# Patient Record
Sex: MALE | Race: BLACK OR AFRICAN AMERICAN | NOT HISPANIC OR LATINO | URBAN - METROPOLITAN AREA
[De-identification: names, ages, dates, MRNs, and addresses within clinical notes are randomized per-mention and may not be internally consistent; named-entity substitution may affect disease eponyms.]

---

## 2019-03-31 ENCOUNTER — EMERGENCY (EMERGENCY)
Facility: HOSPITAL | Age: 59
LOS: 1 days | Discharge: HOME | End: 2019-03-31
Attending: EMERGENCY MEDICINE | Admitting: EMERGENCY MEDICINE
Payer: SELF-PAY

## 2019-03-31 VITALS
SYSTOLIC BLOOD PRESSURE: 137 MMHG | TEMPERATURE: 98 F | DIASTOLIC BLOOD PRESSURE: 82 MMHG | OXYGEN SATURATION: 99 % | RESPIRATION RATE: 16 BRPM | HEART RATE: 91 BPM

## 2019-03-31 VITALS
OXYGEN SATURATION: 100 % | DIASTOLIC BLOOD PRESSURE: 77 MMHG | HEART RATE: 67 BPM | SYSTOLIC BLOOD PRESSURE: 162 MMHG | RESPIRATION RATE: 18 BRPM | TEMPERATURE: 98 F

## 2019-03-31 DIAGNOSIS — Z98.89 OTHER SPECIFIED POSTPROCEDURAL STATES: Chronic | ICD-10-CM

## 2019-03-31 LAB
ALBUMIN SERPL ELPH-MCNC: 4.2 G/DL — SIGNIFICANT CHANGE UP (ref 3.3–5)
ALP SERPL-CCNC: 66 U/L — SIGNIFICANT CHANGE UP (ref 40–120)
ALT FLD-CCNC: 26 U/L — SIGNIFICANT CHANGE UP (ref 4–41)
ANION GAP SERPL CALC-SCNC: 14 MMO/L — SIGNIFICANT CHANGE UP (ref 7–14)
APPEARANCE UR: CLEAR — SIGNIFICANT CHANGE UP
APTT BLD: 30.1 SEC — SIGNIFICANT CHANGE UP (ref 27.5–36.3)
AST SERPL-CCNC: 33 U/L — SIGNIFICANT CHANGE UP (ref 4–40)
BACTERIA # UR AUTO: NEGATIVE — SIGNIFICANT CHANGE UP
BASE EXCESS BLDV CALC-SCNC: 6.1 MMOL/L — SIGNIFICANT CHANGE UP
BASOPHILS # BLD AUTO: 0.05 K/UL — SIGNIFICANT CHANGE UP (ref 0–0.2)
BASOPHILS NFR BLD AUTO: 0.5 % — SIGNIFICANT CHANGE UP (ref 0–2)
BILIRUB SERPL-MCNC: 0.4 MG/DL — SIGNIFICANT CHANGE UP (ref 0.2–1.2)
BILIRUB UR-MCNC: NEGATIVE — SIGNIFICANT CHANGE UP
BLD GP AB SCN SERPL QL: NEGATIVE — SIGNIFICANT CHANGE UP
BLOOD GAS VENOUS - CREATININE: 1.06 MG/DL — SIGNIFICANT CHANGE UP (ref 0.5–1.3)
BLOOD UR QL VISUAL: NEGATIVE — SIGNIFICANT CHANGE UP
BUN SERPL-MCNC: 16 MG/DL — SIGNIFICANT CHANGE UP (ref 7–23)
CALCIUM SERPL-MCNC: 9.2 MG/DL — SIGNIFICANT CHANGE UP (ref 8.4–10.5)
CHLORIDE BLDV-SCNC: 100 MMOL/L — SIGNIFICANT CHANGE UP (ref 96–108)
CHLORIDE SERPL-SCNC: 100 MMOL/L — SIGNIFICANT CHANGE UP (ref 98–107)
CO2 SERPL-SCNC: 24 MMOL/L — SIGNIFICANT CHANGE UP (ref 22–31)
COLOR SPEC: YELLOW — SIGNIFICANT CHANGE UP
CREAT SERPL-MCNC: 1.17 MG/DL — SIGNIFICANT CHANGE UP (ref 0.5–1.3)
EOSINOPHIL # BLD AUTO: 0.18 K/UL — SIGNIFICANT CHANGE UP (ref 0–0.5)
EOSINOPHIL NFR BLD AUTO: 1.7 % — SIGNIFICANT CHANGE UP (ref 0–6)
GAS PNL BLDV: 133 MMOL/L — LOW (ref 136–146)
GLUCOSE BLDV-MCNC: 114 — HIGH (ref 70–99)
GLUCOSE SERPL-MCNC: 117 MG/DL — HIGH (ref 70–99)
GLUCOSE UR-MCNC: NEGATIVE — SIGNIFICANT CHANGE UP
HCO3 BLDV-SCNC: 28 MMOL/L — HIGH (ref 20–27)
HCT VFR BLD CALC: 51 % — HIGH (ref 39–50)
HCT VFR BLDV CALC: 52.3 % — HIGH (ref 39–51)
HGB BLD-MCNC: 16.6 G/DL — SIGNIFICANT CHANGE UP (ref 13–17)
HGB BLDV-MCNC: 17.1 G/DL — HIGH (ref 13–17)
HYALINE CASTS # UR AUTO: HIGH
IMM GRANULOCYTES NFR BLD AUTO: 0.2 % — SIGNIFICANT CHANGE UP (ref 0–1.5)
INR BLD: 1.1 — SIGNIFICANT CHANGE UP (ref 0.88–1.17)
KETONES UR-MCNC: SIGNIFICANT CHANGE UP
LACTATE BLDV-MCNC: 2.2 MMOL/L — HIGH (ref 0.5–2)
LEUKOCYTE ESTERASE UR-ACNC: NEGATIVE — SIGNIFICANT CHANGE UP
LYMPHOCYTES # BLD AUTO: 2.99 K/UL — SIGNIFICANT CHANGE UP (ref 1–3.3)
LYMPHOCYTES # BLD AUTO: 28.3 % — SIGNIFICANT CHANGE UP (ref 13–44)
MCHC RBC-ENTMCNC: 29.6 PG — SIGNIFICANT CHANGE UP (ref 27–34)
MCHC RBC-ENTMCNC: 32.5 % — SIGNIFICANT CHANGE UP (ref 32–36)
MCV RBC AUTO: 91.1 FL — SIGNIFICANT CHANGE UP (ref 80–100)
MONOCYTES # BLD AUTO: 0.93 K/UL — HIGH (ref 0–0.9)
MONOCYTES NFR BLD AUTO: 8.8 % — SIGNIFICANT CHANGE UP (ref 2–14)
NEUTROPHILS # BLD AUTO: 6.4 K/UL — SIGNIFICANT CHANGE UP (ref 1.8–7.4)
NEUTROPHILS NFR BLD AUTO: 60.5 % — SIGNIFICANT CHANGE UP (ref 43–77)
NITRITE UR-MCNC: NEGATIVE — SIGNIFICANT CHANGE UP
NRBC # FLD: 0 K/UL — SIGNIFICANT CHANGE UP (ref 0–0)
PCO2 BLDV: 51 MMHG — SIGNIFICANT CHANGE UP (ref 41–51)
PH BLDV: 7.4 PH — SIGNIFICANT CHANGE UP (ref 7.32–7.43)
PH UR: 5.5 — SIGNIFICANT CHANGE UP (ref 5–8)
PLATELET # BLD AUTO: 162 K/UL — SIGNIFICANT CHANGE UP (ref 150–400)
PMV BLD: 10.4 FL — SIGNIFICANT CHANGE UP (ref 7–13)
PO2 BLDV: 42 MMHG — HIGH (ref 35–40)
POTASSIUM BLDV-SCNC: 6.4 MMOL/L — CRITICAL HIGH (ref 3.4–4.5)
POTASSIUM SERPL-MCNC: 4.1 MMOL/L — SIGNIFICANT CHANGE UP (ref 3.5–5.3)
POTASSIUM SERPL-SCNC: 4.1 MMOL/L — SIGNIFICANT CHANGE UP (ref 3.5–5.3)
PROT SERPL-MCNC: 6.9 G/DL — SIGNIFICANT CHANGE UP (ref 6–8.3)
PROT UR-MCNC: 30 — SIGNIFICANT CHANGE UP
PROTHROM AB SERPL-ACNC: 12.6 SEC — SIGNIFICANT CHANGE UP (ref 9.8–13.1)
RBC # BLD: 5.6 M/UL — SIGNIFICANT CHANGE UP (ref 4.2–5.8)
RBC # FLD: 12.5 % — SIGNIFICANT CHANGE UP (ref 10.3–14.5)
RBC CASTS # UR COMP ASSIST: SIGNIFICANT CHANGE UP (ref 0–?)
RH IG SCN BLD-IMP: POSITIVE — SIGNIFICANT CHANGE UP
SAO2 % BLDV: 78.9 % — SIGNIFICANT CHANGE UP (ref 60–85)
SODIUM SERPL-SCNC: 138 MMOL/L — SIGNIFICANT CHANGE UP (ref 135–145)
SP GR SPEC: 1.04 — SIGNIFICANT CHANGE UP (ref 1–1.04)
SQUAMOUS # UR AUTO: SIGNIFICANT CHANGE UP
UROBILINOGEN FLD QL: SIGNIFICANT CHANGE UP
WBC # BLD: 10.57 K/UL — HIGH (ref 3.8–10.5)
WBC # FLD AUTO: 10.57 K/UL — HIGH (ref 3.8–10.5)
WBC UR QL: SIGNIFICANT CHANGE UP (ref 0–?)

## 2019-03-31 PROCEDURE — 74177 CT ABD & PELVIS W/CONTRAST: CPT | Mod: 26

## 2019-03-31 PROCEDURE — 99284 EMERGENCY DEPT VISIT MOD MDM: CPT

## 2019-03-31 RX ORDER — SODIUM CHLORIDE 9 MG/ML
1000 INJECTION INTRAMUSCULAR; INTRAVENOUS; SUBCUTANEOUS ONCE
Qty: 0 | Refills: 0 | Status: COMPLETED | OUTPATIENT
Start: 2019-03-31 | End: 2019-03-31

## 2019-03-31 RX ORDER — METRONIDAZOLE 500 MG
1 TABLET ORAL
Qty: 30 | Refills: 0 | OUTPATIENT
Start: 2019-03-31 | End: 2019-04-09

## 2019-03-31 RX ORDER — CIPROFLOXACIN LACTATE 400MG/40ML
400 VIAL (ML) INTRAVENOUS ONCE
Qty: 0 | Refills: 0 | Status: COMPLETED | OUTPATIENT
Start: 2019-03-31 | End: 2019-03-31

## 2019-03-31 RX ORDER — CIPROFLOXACIN LACTATE 400MG/40ML
1 VIAL (ML) INTRAVENOUS
Qty: 20 | Refills: 0 | OUTPATIENT
Start: 2019-03-31 | End: 2019-04-09

## 2019-03-31 RX ORDER — ACETAMINOPHEN 500 MG
1000 TABLET ORAL ONCE
Qty: 0 | Refills: 0 | Status: COMPLETED | OUTPATIENT
Start: 2019-03-31 | End: 2019-03-31

## 2019-03-31 RX ORDER — METRONIDAZOLE 500 MG
500 TABLET ORAL ONCE
Qty: 0 | Refills: 0 | Status: DISCONTINUED | OUTPATIENT
Start: 2019-03-31 | End: 2019-03-31

## 2019-03-31 RX ORDER — ONDANSETRON 8 MG/1
4 TABLET, FILM COATED ORAL ONCE
Qty: 0 | Refills: 0 | Status: COMPLETED | OUTPATIENT
Start: 2019-03-31 | End: 2019-03-31

## 2019-03-31 RX ORDER — METRONIDAZOLE 500 MG
500 TABLET ORAL ONCE
Qty: 0 | Refills: 0 | Status: COMPLETED | OUTPATIENT
Start: 2019-03-31 | End: 2019-03-31

## 2019-03-31 RX ADMIN — Medication 500 MILLIGRAM(S): at 22:11

## 2019-03-31 RX ADMIN — Medication 200 MILLIGRAM(S): at 21:29

## 2019-03-31 RX ADMIN — SODIUM CHLORIDE 1000 MILLILITER(S): 9 INJECTION INTRAMUSCULAR; INTRAVENOUS; SUBCUTANEOUS at 20:53

## 2019-03-31 RX ADMIN — ONDANSETRON 4 MILLIGRAM(S): 8 TABLET, FILM COATED ORAL at 19:27

## 2019-03-31 RX ADMIN — Medication 400 MILLIGRAM(S): at 19:26

## 2019-03-31 RX ADMIN — SODIUM CHLORIDE 1000 MILLILITER(S): 9 INJECTION INTRAMUSCULAR; INTRAVENOUS; SUBCUTANEOUS at 19:27

## 2019-03-31 NOTE — ED PROVIDER NOTE - CLINICAL SUMMARY MEDICAL DECISION MAKING FREE TEXT BOX
Pt c h/o hernia repair p/w severe lower abd pain and guarding, small watery rectal dc today, constipated  Suspect SBO/LBO vs divertic.  Will send labs, pain control, CTAP, reassess.

## 2019-03-31 NOTE — ED ADULT TRIAGE NOTE - CHIEF COMPLAINT QUOTE
Pt c/o LLQ pain since yesterday morning with vomiting and diarrhea.  Pt also endorses generalized abdominal pain  and headache when using bathroom.  Pt reports saw blood in stool today as well.

## 2019-03-31 NOTE — ED PROVIDER NOTE - NSFOLLOWUPINSTRUCTIONS_ED_ALL_ED_FT
Your symptoms are likely caused by colitis.  PLease take Ciprofloxacin and Metronidazole as prescribed.  You may use Ibuprofen 600 mg every 6 hours or tylenol 650 mg every 4-6 hours as needed for pain.  Avoid fried foods, spicy foods, alcohol, or any other foods you might be sensitive to.  Be sure to drink plenty of fluids. You may use a stool softener such as Colace 100 mg three times a day with meals.     Return to the ER for fevers, worsening pain, vomiting, inability to have a BM, or any other concerning signs.     It is important that you follow up with your doctor and also with a Gastroenterologist.  You should have a colonoscopy done after you recover to ensure healing and screen for cancer.  Try to see your doctor this week.

## 2019-03-31 NOTE — ED PROVIDER NOTE - PROGRESS NOTE DETAILS
Feeling better, pain improved, tolerating PO.  Offered CDU to assess response to tx but pt prefers to go home.  Understands he may feel sicker but prefers to try PO abx at home and will return if worsening.  Wife at bedside in agreement. cipro/flagyl rx sent.

## 2019-04-02 LAB
BACTERIA UR CULT: SIGNIFICANT CHANGE UP
SPECIMEN SOURCE: SIGNIFICANT CHANGE UP

## 2019-05-17 ENCOUNTER — EMERGENCY (EMERGENCY)
Facility: HOSPITAL | Age: 59
LOS: 1 days | Discharge: HOME | End: 2019-05-17
Admitting: EMERGENCY MEDICINE
Payer: COMMERCIAL

## 2019-05-17 VITALS
RESPIRATION RATE: 15 BRPM | HEART RATE: 88 BPM | DIASTOLIC BLOOD PRESSURE: 62 MMHG | SYSTOLIC BLOOD PRESSURE: 101 MMHG | TEMPERATURE: 98 F | OXYGEN SATURATION: 98 %

## 2019-05-17 DIAGNOSIS — Z98.89 OTHER SPECIFIED POSTPROCEDURAL STATES: Chronic | ICD-10-CM

## 2019-05-17 PROCEDURE — 99053 MED SERV 10PM-8AM 24 HR FAC: CPT

## 2019-05-17 PROCEDURE — 99283 EMERGENCY DEPT VISIT LOW MDM: CPT | Mod: 25
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
## 2019-05-18 PROCEDURE — 73660 X-RAY EXAM OF TOE(S): CPT | Mod: 26,RT

## 2019-05-18 PROCEDURE — 72100 X-RAY EXAM L-S SPINE 2/3 VWS: CPT | Mod: 26

## 2019-05-18 RX ORDER — ACETAMINOPHEN 500 MG
650 TABLET ORAL ONCE
Refills: 0 | Status: COMPLETED | OUTPATIENT
Start: 2019-05-18 | End: 2019-05-18

## 2019-05-18 RX ORDER — LIDOCAINE 4 G/100G
1 CREAM TOPICAL ONCE
Refills: 0 | Status: COMPLETED | OUTPATIENT
Start: 2019-05-18 | End: 2019-05-18

## 2019-05-18 RX ORDER — IBUPROFEN 200 MG
600 TABLET ORAL ONCE
Refills: 0 | Status: COMPLETED | OUTPATIENT
Start: 2019-05-18 | End: 2019-05-18

## 2019-05-18 RX ORDER — CYCLOBENZAPRINE HYDROCHLORIDE 10 MG/1
5 TABLET, FILM COATED ORAL ONCE
Refills: 0 | Status: COMPLETED | OUTPATIENT
Start: 2019-05-18 | End: 2019-05-18

## 2019-05-18 RX ORDER — CYCLOBENZAPRINE HYDROCHLORIDE 10 MG/1
1 TABLET, FILM COATED ORAL
Qty: 12 | Refills: 0
Start: 2019-05-18 | End: 2019-05-21

## 2019-05-18 RX ORDER — IBUPROFEN 200 MG
1 TABLET ORAL
Qty: 16 | Refills: 0
Start: 2019-05-18 | End: 2019-05-21

## 2019-05-18 RX ADMIN — LIDOCAINE 1 PATCH: 4 CREAM TOPICAL at 00:35

## 2019-05-18 RX ADMIN — CYCLOBENZAPRINE HYDROCHLORIDE 5 MILLIGRAM(S): 10 TABLET, FILM COATED ORAL at 00:36

## 2019-05-18 RX ADMIN — Medication 600 MILLIGRAM(S): at 00:36

## 2019-05-18 RX ADMIN — Medication 650 MILLIGRAM(S): at 00:36
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
## 2019-05-18 NOTE — ED PROVIDER NOTE - PROGRESS NOTE DETAILS
FLYNN Tenorio: pt signed out to me by FLYNN Hargrove. Pt NAD, VSS, no acute complaints. Discussed results of xrays with the patient. PT ambulatory without assistance. Pt ok for dc.
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
## 2019-05-18 NOTE — ED PROVIDER NOTE - NSFOLLOWUPINSTRUCTIONS_ED_ALL_ED_FT
Limit further injury, over exertion, and rest affected area. Take Flexeril 10mg three times a day as needed for muscle spasms - caution drowsiness while taking this medication- do not drive or operate heavy machinery.  Take motrin 600mg every 6h as needed for pain. Please continue all home medications as directed. See your regular doctor within 72 hours for follow-up care. Return to ER for new or worsening symptoms.

## 2019-05-18 NOTE — ED PROVIDER NOTE - NONTENDER LOCATION
periumbilical/suprapubic/left costovertebral angle/left upper quadrant/right upper quadrant/right lower quadrant/right costovertebral angle/left lower quadrant/umbilical

## 2020-11-27 ENCOUNTER — EMERGENCY (EMERGENCY)
Facility: HOSPITAL | Age: 60
LOS: 1 days | Discharge: HOME | End: 2020-11-27
Attending: EMERGENCY MEDICINE | Admitting: EMERGENCY MEDICINE
Payer: MEDICAID

## 2020-11-27 VITALS
SYSTOLIC BLOOD PRESSURE: 138 MMHG | TEMPERATURE: 98 F | DIASTOLIC BLOOD PRESSURE: 77 MMHG | OXYGEN SATURATION: 100 % | HEART RATE: 81 BPM | HEIGHT: 66 IN | RESPIRATION RATE: 16 BRPM

## 2020-11-27 DIAGNOSIS — Z98.89 OTHER SPECIFIED POSTPROCEDURAL STATES: Chronic | ICD-10-CM

## 2020-11-27 PROCEDURE — 99284 EMERGENCY DEPT VISIT MOD MDM: CPT

## 2020-11-27 RX ORDER — SODIUM CHLORIDE 9 MG/ML
1000 INJECTION INTRAMUSCULAR; INTRAVENOUS; SUBCUTANEOUS ONCE
Refills: 0 | Status: COMPLETED | OUTPATIENT
Start: 2020-11-27 | End: 2020-11-27

## 2020-11-27 NOTE — ED PROVIDER NOTE - NSFOLLOWUPCLINICS_GEN_ALL_ED_FT
[facility]  Gastroenterology  [address] [place] [id number]  Phone: [phone]  Fax:   Follow Up Time: Routine

## 2020-11-27 NOTE — ED PROVIDER NOTE - PROGRESS NOTE DETAILS
GRIS: I was signed out this pt pending CT read which was read as uncomplicated acute diverticulitis. Pt aware of findings, concern for CA but explained there were not current CT findings concerning for this. Pt reports he has never had N/V during his last 3 days of LLQ pain. Does had history of constipation which may have led to his current condition. Pt had GI follow up and last Colonscopy was 1 year ago and told normal. Is tolerating PO, no reported fevers, chill. Explained that pt requires PMD and GI outpt follow up and no Abx will be given today but would advise to f/u with PMD and GI for further recs. If spikes fever or has chills or not able to tolerate PO, come right back to ED for further eval. Pt aware and amenable. he was given copy of all his labs and imaging results to f/u with PMD/GI with.

## 2020-11-27 NOTE — ED ADULT NURSE NOTE - OBJECTIVE STATEMENT
61 y/o male presents to ED complaining of left lower abdominal pain x3 days. Pt a&ox4, complaining of constipation, pt states he had a small bowel movement this am, was not like his usual bm's. Pt endorses passing gas and loss of appetite. pt denies sob, chest pain, n/v. 20g IV placed to LAC. Labs obtained as ordered. NS infusing.

## 2020-11-27 NOTE — ED PROVIDER NOTE - OBJECTIVE STATEMENT
59 y/o male with pmhx of HTN, constipation, hernia repair, presents to ED c/o LLQ pain x 2 days. States he has been straining to pass bowel movements, LBM today. Passing gas. No melena, no bloody stool. Has not taken any pain medication or stool softeners. No fever, chills, cp, sob, n/v/d, dysuria.

## 2020-11-27 NOTE — ED ADULT TRIAGE NOTE - CHIEF COMPLAINT QUOTE
Pt w/ hx of htn, left inguinal hernia  c/o lower abdominal pain, constipation Pt endorses 1 small BM in 3 days Pt endorses passing gas, Pt denies NV, black stools

## 2020-11-27 NOTE — ED PROVIDER NOTE - ATTENDING CONTRIBUTION TO CARE
Dolly: I have seen and examined the patient face to face, have reviewed and addended the HPI, PE and a/p as necessary.     61 yo M with HTN, constipation, hernia repair a/w LLQ pain x 2 days.  Noted to have decreased bowel movements with small bowel movement this AM.  Reports passing gas.  No fever/chills, No photophobia/eye pain/changes in vision, No ear pain/sore throat/dysphagia, No chest pain/palpitations, no SOB/cough/wheeze/stridor, No N/V/D, no dysuria/frequency/discharge, No neck/back pain, no rash, no changes in neurological status/function.     GEN - NAD; well appearing; A+O x3; non-toxic appearing; CARD -s1s2, RRR, no M,G,R; PULM - CTA b/l, symmetric breath sounds; ABD -  +BS, TTP in LLQ, soft, no guarding, no rebound, no masses; BACK - no CVA tenderness, Normal  spine; EXT - symmetric pulses, 2+ dp, capillary refill < 2 seconds, no cyanosis, no edema; NEURO - no focal neuro deficits, no slurred speech    61 yo M with HTN, constipation, hernia repair a/w LLQ pain x 2 days, concern for possible diverticulitis, vs intraabdominal abscess, pain meds refused.  No fevers, no chills, no other complaints.  Will continue to monitor and give IVF.  Pending CT scan.

## 2020-11-27 NOTE — ED PROVIDER NOTE - NSFOLLOWUPINSTRUCTIONS_ED_ALL_ED_FT
No signs of emergency medical condition on today's workup.  Presumptive diagnosis made, but further evaluation may be required by your primary care doctor or specialist for a definitive diagnosis.  Therefore, follow up as directed and if symptoms change/worsen or any emergency conditions, please return to the ER.     PLEASE FOLLOW UP WITH YOUR PRIMARY DOCTOR AND GASTROENTEROLOGIST (GI) DOCTOR WITH YOUR CURRENT MEDICAL CONDITION.     TAKE OVER THE COUNTER PAIN MEDS AS NEEDED FOR PAIN.     TAKE YOUR STOOL SOFTNERS IN THE MEANTIME AND DRINK LOTS OF FLUIDS.

## 2020-11-27 NOTE — ED PROVIDER NOTE - PATIENT PORTAL LINK FT
You can access the FollowMyHealth Patient Portal offered by [facility] by registering at the following website: [email or web address]. By joining [employer]s FollowMyHealth portal, you will also be able to view your health information using other applications (apps) compatible with our system.

## 2020-11-27 NOTE — ED PROVIDER NOTE - CLINICAL SUMMARY MEDICAL DECISION MAKING FREE TEXT BOX
61 y/o male with pmhx of HTN, constipation, hernia repair, presents to ED c/o LLQ pain x 2 days. States he has been straining to pass bowel movements, LBM today. Passing gas. No melena, no bloody stool. pt deferring pain medication. +LLQ tenderness on exam. plan to check CT r/o diverticulitis, labs, ua.

## 2020-11-28 LAB
ALBUMIN SERPL ELPH-MCNC: 3.6 G/DL — SIGNIFICANT CHANGE UP (ref 3.3–5)
ALP SERPL-CCNC: 52 U/L — SIGNIFICANT CHANGE UP (ref 40–120)
ALT FLD-CCNC: 19 U/L — SIGNIFICANT CHANGE UP (ref 4–41)
ANION GAP SERPL CALC-SCNC: 11 MMO/L — SIGNIFICANT CHANGE UP (ref 7–14)
APTT BLD: 31.1 SEC — SIGNIFICANT CHANGE UP (ref 27–36.3)
AST SERPL-CCNC: 22 U/L — SIGNIFICANT CHANGE UP (ref 4–40)
B PERT DNA SPEC QL NAA+PROBE: SIGNIFICANT CHANGE UP
BASE EXCESS BLDV CALC-SCNC: 4.9 MMOL/L — SIGNIFICANT CHANGE UP
BASOPHILS # BLD AUTO: 0.04 K/UL — SIGNIFICANT CHANGE UP (ref 0–0.2)
BASOPHILS NFR BLD AUTO: 0.6 % — SIGNIFICANT CHANGE UP (ref 0–2)
BILIRUB SERPL-MCNC: 0.2 MG/DL — SIGNIFICANT CHANGE UP (ref 0.2–1.2)
BLD GP AB SCN SERPL QL: NEGATIVE — SIGNIFICANT CHANGE UP
BLOOD GAS VENOUS - CREATININE: 1.25 MG/DL — SIGNIFICANT CHANGE UP (ref 0.5–1.3)
BLOOD GAS VENOUS - FIO2: 21 — SIGNIFICANT CHANGE UP
BUN SERPL-MCNC: 26 MG/DL — HIGH (ref 7–23)
C PNEUM DNA SPEC QL NAA+PROBE: SIGNIFICANT CHANGE UP
CALCIUM SERPL-MCNC: 8.9 MG/DL — SIGNIFICANT CHANGE UP (ref 8.4–10.5)
CHLORIDE BLDV-SCNC: 105 MMOL/L — SIGNIFICANT CHANGE UP (ref 96–108)
CHLORIDE SERPL-SCNC: 101 MMOL/L — SIGNIFICANT CHANGE UP (ref 98–107)
CO2 SERPL-SCNC: 27 MMOL/L — SIGNIFICANT CHANGE UP (ref 22–31)
CREAT SERPL-MCNC: 1.21 MG/DL — SIGNIFICANT CHANGE UP (ref 0.5–1.3)
EOSINOPHIL # BLD AUTO: 0.18 K/UL — SIGNIFICANT CHANGE UP (ref 0–0.5)
EOSINOPHIL NFR BLD AUTO: 2.5 % — SIGNIFICANT CHANGE UP (ref 0–6)
FLUAV H1 2009 PAND RNA SPEC QL NAA+PROBE: SIGNIFICANT CHANGE UP
FLUAV H1 RNA SPEC QL NAA+PROBE: SIGNIFICANT CHANGE UP
FLUAV H3 RNA SPEC QL NAA+PROBE: SIGNIFICANT CHANGE UP
FLUAV SUBTYP SPEC NAA+PROBE: SIGNIFICANT CHANGE UP
FLUBV RNA SPEC QL NAA+PROBE: SIGNIFICANT CHANGE UP
GAS PNL BLDV: 138 MMOL/L — SIGNIFICANT CHANGE UP (ref 136–146)
GLUCOSE BLDV-MCNC: 141 MG/DL — HIGH (ref 70–99)
GLUCOSE SERPL-MCNC: 152 MG/DL — HIGH (ref 70–99)
HADV DNA SPEC QL NAA+PROBE: SIGNIFICANT CHANGE UP
HCO3 BLDV-SCNC: 28 MMOL/L — HIGH (ref 20–27)
HCOV PNL SPEC NAA+PROBE: SIGNIFICANT CHANGE UP
HCT VFR BLD CALC: 45.3 % — SIGNIFICANT CHANGE UP (ref 39–50)
HCT VFR BLDV CALC: 46.7 % — SIGNIFICANT CHANGE UP (ref 39–51)
HGB BLD-MCNC: 14.7 G/DL — SIGNIFICANT CHANGE UP (ref 13–17)
HGB BLDV-MCNC: 15.2 G/DL — SIGNIFICANT CHANGE UP (ref 13–17)
HMPV RNA SPEC QL NAA+PROBE: SIGNIFICANT CHANGE UP
HPIV1 RNA SPEC QL NAA+PROBE: SIGNIFICANT CHANGE UP
HPIV2 RNA SPEC QL NAA+PROBE: SIGNIFICANT CHANGE UP
HPIV3 RNA SPEC QL NAA+PROBE: SIGNIFICANT CHANGE UP
HPIV4 RNA SPEC QL NAA+PROBE: SIGNIFICANT CHANGE UP
IMM GRANULOCYTES NFR BLD AUTO: 0.3 % — SIGNIFICANT CHANGE UP (ref 0–1.5)
INR BLD: 1.11 — SIGNIFICANT CHANGE UP (ref 0.88–1.16)
LACTATE BLDV-MCNC: 1.4 MMOL/L — SIGNIFICANT CHANGE UP (ref 0.5–2)
LYMPHOCYTES # BLD AUTO: 2.51 K/UL — SIGNIFICANT CHANGE UP (ref 1–3.3)
LYMPHOCYTES # BLD AUTO: 35.1 % — SIGNIFICANT CHANGE UP (ref 13–44)
MCHC RBC-ENTMCNC: 30 PG — SIGNIFICANT CHANGE UP (ref 27–34)
MCHC RBC-ENTMCNC: 32.5 % — SIGNIFICANT CHANGE UP (ref 32–36)
MCV RBC AUTO: 92.4 FL — SIGNIFICANT CHANGE UP (ref 80–100)
MONOCYTES # BLD AUTO: 0.57 K/UL — SIGNIFICANT CHANGE UP (ref 0–0.9)
MONOCYTES NFR BLD AUTO: 8 % — SIGNIFICANT CHANGE UP (ref 2–14)
NEUTROPHILS # BLD AUTO: 3.84 K/UL — SIGNIFICANT CHANGE UP (ref 1.8–7.4)
NEUTROPHILS NFR BLD AUTO: 53.5 % — SIGNIFICANT CHANGE UP (ref 43–77)
NRBC # FLD: 0 K/UL — SIGNIFICANT CHANGE UP (ref 0–0)
PCO2 BLDV: 51 MMHG — SIGNIFICANT CHANGE UP (ref 41–51)
PH BLDV: 7.39 PH — SIGNIFICANT CHANGE UP (ref 7.32–7.43)
PLATELET # BLD AUTO: 156 K/UL — SIGNIFICANT CHANGE UP (ref 150–400)
PMV BLD: 10.7 FL — SIGNIFICANT CHANGE UP (ref 7–13)
PO2 BLDV: 53 MMHG — HIGH (ref 35–40)
POTASSIUM BLDV-SCNC: 3.8 MMOL/L — SIGNIFICANT CHANGE UP (ref 3.4–4.5)
POTASSIUM SERPL-MCNC: 3.4 MMOL/L — LOW (ref 3.5–5.3)
POTASSIUM SERPL-SCNC: 3.4 MMOL/L — LOW (ref 3.5–5.3)
PROT SERPL-MCNC: 6.2 G/DL — SIGNIFICANT CHANGE UP (ref 6–8.3)
PROTHROM AB SERPL-ACNC: 12.7 SEC — SIGNIFICANT CHANGE UP (ref 10.6–13.6)
RAPID RVP RESULT: SIGNIFICANT CHANGE UP
RBC # BLD: 4.9 M/UL — SIGNIFICANT CHANGE UP (ref 4.2–5.8)
RBC # FLD: 12.2 % — SIGNIFICANT CHANGE UP (ref 10.3–14.5)
RH IG SCN BLD-IMP: POSITIVE — SIGNIFICANT CHANGE UP
RSV RNA SPEC QL NAA+PROBE: SIGNIFICANT CHANGE UP
RV+EV RNA SPEC QL NAA+PROBE: SIGNIFICANT CHANGE UP
SAO2 % BLDV: 88.3 % — HIGH (ref 60–85)
SARS-COV-2 RNA SPEC QL NAA+PROBE: SIGNIFICANT CHANGE UP
SODIUM SERPL-SCNC: 139 MMOL/L — SIGNIFICANT CHANGE UP (ref 135–145)
WBC # BLD: 7.16 K/UL — SIGNIFICANT CHANGE UP (ref 3.8–10.5)
WBC # FLD AUTO: 7.16 K/UL — SIGNIFICANT CHANGE UP (ref 3.8–10.5)

## 2020-11-28 PROCEDURE — 74177 CT ABD & PELVIS W/CONTRAST: CPT | Mod: 26

## 2020-11-28 RX ORDER — POLYETHYLENE GLYCOL 3350 17 G/17G
17 POWDER, FOR SOLUTION ORAL
Qty: 119 | Refills: 0
Start: 2020-11-28 | End: 2020-12-04

## 2020-11-28 RX ORDER — PIPERACILLIN AND TAZOBACTAM 4; .5 G/20ML; G/20ML
3.38 INJECTION, POWDER, LYOPHILIZED, FOR SOLUTION INTRAVENOUS ONCE
Refills: 0 | Status: COMPLETED | OUTPATIENT
Start: 2020-11-28 | End: 2020-11-28

## 2020-11-28 RX ORDER — ACETAMINOPHEN 500 MG
2 TABLET ORAL
Qty: 60 | Refills: 0
Start: 2020-11-28 | End: 2020-12-02

## 2020-11-28 RX ADMIN — SODIUM CHLORIDE 1000 MILLILITER(S): 9 INJECTION INTRAMUSCULAR; INTRAVENOUS; SUBCUTANEOUS at 03:20

## 2020-11-28 RX ADMIN — SODIUM CHLORIDE 1000 MILLILITER(S): 9 INJECTION INTRAMUSCULAR; INTRAVENOUS; SUBCUTANEOUS at 00:21

## 2020-11-28 RX ADMIN — PIPERACILLIN AND TAZOBACTAM 3.38 GRAM(S): 4; .5 INJECTION, POWDER, LYOPHILIZED, FOR SOLUTION INTRAVENOUS at 03:29

## 2020-11-28 RX ADMIN — PIPERACILLIN AND TAZOBACTAM 200 GRAM(S): 4; .5 INJECTION, POWDER, LYOPHILIZED, FOR SOLUTION INTRAVENOUS at 02:08

## 2021-04-23 NOTE — ED ADULT TRIAGE NOTE - HEIGHT IN INCHES
6 Doxepin Counseling:  Patient advised that the medication is sedating and not to drive a car after taking this medication. Patient informed of potential adverse effects including but not limited to dry mouth, urinary retention, and blurry vision.  The patient verbalized understanding of the proper use and possible adverse effects of doxepin.  All of the patient's questions and concerns were addressed.

## 2021-05-13 ENCOUNTER — EMERGENCY (EMERGENCY)
Facility: HOSPITAL | Age: 61
LOS: 1 days | Discharge: HOME | End: 2021-05-13
Attending: EMERGENCY MEDICINE | Admitting: EMERGENCY MEDICINE
Payer: MEDICAID

## 2021-05-13 VITALS
DIASTOLIC BLOOD PRESSURE: 70 MMHG | OXYGEN SATURATION: 98 % | RESPIRATION RATE: 16 BRPM | HEART RATE: 80 BPM | HEIGHT: 66 IN | SYSTOLIC BLOOD PRESSURE: 161 MMHG | TEMPERATURE: 98 F

## 2021-05-13 DIAGNOSIS — Z98.89 OTHER SPECIFIED POSTPROCEDURAL STATES: Chronic | ICD-10-CM

## 2021-05-13 LAB
ALBUMIN SERPL ELPH-MCNC: 4.2 G/DL — SIGNIFICANT CHANGE UP (ref 3.3–5)
ALP SERPL-CCNC: 69 U/L — SIGNIFICANT CHANGE UP (ref 40–120)
ALT FLD-CCNC: 13 U/L — SIGNIFICANT CHANGE UP (ref 4–41)
ANION GAP SERPL CALC-SCNC: 10 MMOL/L — SIGNIFICANT CHANGE UP (ref 7–14)
AST SERPL-CCNC: 29 U/L — SIGNIFICANT CHANGE UP (ref 4–40)
BASOPHILS # BLD AUTO: 0.03 K/UL — SIGNIFICANT CHANGE UP (ref 0–0.2)
BASOPHILS NFR BLD AUTO: 0.5 % — SIGNIFICANT CHANGE UP (ref 0–2)
BILIRUB SERPL-MCNC: 0.5 MG/DL — SIGNIFICANT CHANGE UP (ref 0.2–1.2)
BLOOD GAS VENOUS COMPREHENSIVE RESULT: SIGNIFICANT CHANGE UP
BUN SERPL-MCNC: 15 MG/DL — SIGNIFICANT CHANGE UP (ref 7–23)
CALCIUM SERPL-MCNC: 8.5 MG/DL — SIGNIFICANT CHANGE UP (ref 8.4–10.5)
CHLORIDE SERPL-SCNC: 104 MMOL/L — SIGNIFICANT CHANGE UP (ref 98–107)
CO2 SERPL-SCNC: 24 MMOL/L — SIGNIFICANT CHANGE UP (ref 22–31)
CREAT SERPL-MCNC: 1.06 MG/DL — SIGNIFICANT CHANGE UP (ref 0.5–1.3)
EOSINOPHIL # BLD AUTO: 0.15 K/UL — SIGNIFICANT CHANGE UP (ref 0–0.5)
EOSINOPHIL NFR BLD AUTO: 2.3 % — SIGNIFICANT CHANGE UP (ref 0–6)
GLUCOSE SERPL-MCNC: 99 MG/DL — SIGNIFICANT CHANGE UP (ref 70–99)
HCT VFR BLD CALC: 47.4 % — SIGNIFICANT CHANGE UP (ref 39–50)
HGB BLD-MCNC: 16.1 G/DL — SIGNIFICANT CHANGE UP (ref 13–17)
IANC: 3.02 K/UL — SIGNIFICANT CHANGE UP (ref 1.5–8.5)
IMM GRANULOCYTES NFR BLD AUTO: 0.2 % — SIGNIFICANT CHANGE UP (ref 0–1.5)
LIDOCAIN IGE QN: 39 U/L — SIGNIFICANT CHANGE UP (ref 7–60)
LYMPHOCYTES # BLD AUTO: 2.66 K/UL — SIGNIFICANT CHANGE UP (ref 1–3.3)
LYMPHOCYTES # BLD AUTO: 41.2 % — SIGNIFICANT CHANGE UP (ref 13–44)
MCHC RBC-ENTMCNC: 30 PG — SIGNIFICANT CHANGE UP (ref 27–34)
MCHC RBC-ENTMCNC: 34 GM/DL — SIGNIFICANT CHANGE UP (ref 32–36)
MCV RBC AUTO: 88.4 FL — SIGNIFICANT CHANGE UP (ref 80–100)
MONOCYTES # BLD AUTO: 0.58 K/UL — SIGNIFICANT CHANGE UP (ref 0–0.9)
MONOCYTES NFR BLD AUTO: 9 % — SIGNIFICANT CHANGE UP (ref 2–14)
NEUTROPHILS # BLD AUTO: 3.02 K/UL — SIGNIFICANT CHANGE UP (ref 1.8–7.4)
NEUTROPHILS NFR BLD AUTO: 46.8 % — SIGNIFICANT CHANGE UP (ref 43–77)
NRBC # BLD: 0 /100 WBCS — SIGNIFICANT CHANGE UP
NRBC # FLD: 0 K/UL — SIGNIFICANT CHANGE UP
PLATELET # BLD AUTO: 148 K/UL — LOW (ref 150–400)
POTASSIUM SERPL-MCNC: 4.2 MMOL/L — SIGNIFICANT CHANGE UP (ref 3.5–5.3)
POTASSIUM SERPL-SCNC: 4.2 MMOL/L — SIGNIFICANT CHANGE UP (ref 3.5–5.3)
PROT SERPL-MCNC: 6.8 G/DL — SIGNIFICANT CHANGE UP (ref 6–8.3)
RBC # BLD: 5.36 M/UL — SIGNIFICANT CHANGE UP (ref 4.2–5.8)
RBC # FLD: 12.9 % — SIGNIFICANT CHANGE UP (ref 10.3–14.5)
SODIUM SERPL-SCNC: 138 MMOL/L — SIGNIFICANT CHANGE UP (ref 135–145)
WBC # BLD: 6.45 K/UL — SIGNIFICANT CHANGE UP (ref 3.8–10.5)
WBC # FLD AUTO: 6.45 K/UL — SIGNIFICANT CHANGE UP (ref 3.8–10.5)

## 2021-05-13 PROCEDURE — 99285 EMERGENCY DEPT VISIT HI MDM: CPT

## 2021-05-13 RX ORDER — SODIUM CHLORIDE 9 MG/ML
1000 INJECTION INTRAMUSCULAR; INTRAVENOUS; SUBCUTANEOUS ONCE
Refills: 0 | Status: COMPLETED | OUTPATIENT
Start: 2021-05-13 | End: 2021-05-13

## 2021-05-13 RX ORDER — MORPHINE SULFATE 50 MG/1
4 CAPSULE, EXTENDED RELEASE ORAL ONCE
Refills: 0 | Status: DISCONTINUED | OUTPATIENT
Start: 2021-05-13 | End: 2021-05-13

## 2021-05-13 RX ADMIN — SODIUM CHLORIDE 1000 MILLILITER(S): 9 INJECTION INTRAMUSCULAR; INTRAVENOUS; SUBCUTANEOUS at 22:00

## 2021-05-13 RX ADMIN — MORPHINE SULFATE 4 MILLIGRAM(S): 50 CAPSULE, EXTENDED RELEASE ORAL at 22:00

## 2021-05-13 NOTE — ED ADULT TRIAGE NOTE - CHIEF COMPLAINT QUOTE
Pt arrives c/o abdominal pain x 2 days. States pain is constant. Reports nausea, vomiting, diarrhea, chills. Denies fevers, urinary complaints. PMHx HTN, hernia

## 2021-05-13 NOTE — ED PROVIDER NOTE - CLINICAL SUMMARY MEDICAL DECISION MAKING FREE TEXT BOX
Jeremiah White, PGY2: 61 year old male p/w abd pain, n/v/d x2 days. PMH diverticulitis but pain feels different. No f/c but is having diaphoresis. +Bloody stools for weeks, no NSAID use. Abd soft and diffusely tender, no rebound. Does not appear to be surgical abdomen. C/f divertic vs PUD vs gastroenteritis vs pancreatitis. Plan for labs, IVF, pain control, CT A/P, reassess.

## 2021-05-13 NOTE — ED ADULT NURSE NOTE - OBJECTIVE STATEMENT
Patient is a 61y male, A&Ox4, ambulatory, steady gait observed, complaining of generalized abdominal pain, was constant 1 day ago but now intermittent. Also reports nausea/vomiting yesterday but not today. history of a hernia and hypertension. Missed today and yesterday's dose of his blood pressure medication, patient is hypertensive, asymptomatic. Respirations even and unlabored. IV initiated by additional RN. Stretcher in lowest position, wheels locked, appropriate side rails in place, call bell in reach.

## 2021-05-13 NOTE — ED PROVIDER NOTE - ATTENDING CONTRIBUTION TO CARE
agree with resident note    "61 year old male PMH HTN, diverticulitis, hernia s/p repair, presents to ED for abdominal pain x2 days. Pain is diffuse, crampy. Associated with loose stools today, nausea, and diaphoresis. Patient also notes blood in stool for past few weeks. Does not use NSAIDs frequently. Patient denies f/c, cp, sob, constipation, or dysuria."    PE: well appearing; VSS; CTAB/L; s1 s2 no m/r/g abd soft/TTP LLQ, no rebound no guarding ext: no edema    Imp: abd pain; colitis/diverticulitis; labs, CT, pain control and reassess

## 2021-05-13 NOTE — ED PROVIDER NOTE - PROGRESS NOTE DETAILS
FLYNN Haney: Pt signed out to me pending CT, BP elevated ordered for home BP med FLYNN Haney: CT showing sigmoid colitis, ordered for cipro/flagyl, will d/c home with PMD and GI follow up. Pt states he has a referral to see a GI doctor already, GI list provided FLYNN Haney: CT showing sigmoid colitis, ordered for cipro/flagyl, will d/c home with PMD and GI follow up. Pt states he has a referral to see a GI doctor already, GI list provided. Pt reports he was recently prescribed a second antihypertensive by his PMD which he will start taking at home.

## 2021-05-13 NOTE — ED PROVIDER NOTE - NSFOLLOWUPINSTRUCTIONS_ED_ALL_ED_FT
Follow up with your primary care doctor within 1 week  Follow up with a GI doctor within 2 weeks, referral list provided  Take Ciprofloxacin 500mg (1 tablet) twice daily for 10 days  Take Flagyl 500mg (1 tablet) 3 times a day for 10 days  Return to the ER with any worsening or concerning symptoms, increased pain, vomiting, fever, weakness or any other concerns.

## 2021-05-13 NOTE — ED PROVIDER NOTE - PATIENT PORTAL LINK FT
You can access the FollowMyHealth Patient Portal offered by [facility] by registering at the following website: [email or web address]. By joining [employer]’s FollowMyHealth portal, you will also be able to view your health information using other applications (apps) compatible with our system.

## 2021-05-13 NOTE — ED PROVIDER NOTE - PHYSICAL EXAMINATION
GENERAL: A&Ox3, non-toxic appearing, no acute distress  HEENT: NCAT, EOMI, oral mucosa moist, normal conjunctiva  RESP: CTAB, no respiratory distress, no wheezes/rhonchi/rales, speaking in full sentences  CV: RRR, no murmurs/rubs/gallops  ABDOMEN: soft, diffusely tender, non-distended, no guarding, no rebound  MSK: no visible deformities  NEURO: no focal sensory or motor deficits, CN 2-12 grossly intact  SKIN: warm, normal color, well perfused, no rash  PSYCH: normal affect

## 2021-05-13 NOTE — ED PROVIDER NOTE - OBJECTIVE STATEMENT
61 year old male PMH HTN, diverticulitis, hernia s/p repair, presents to ED for abdominal pain x2 days. Pain is diffuse, crampy. Associated with loose stools today, nausea, and diaphoresis. Patient also notes blood in stool for past few weeks. Does not use NSAIDs frequently. Patient denies f/c, cp, sob, constipation, or dysuria.

## 2021-05-14 VITALS
HEART RATE: 64 BPM | TEMPERATURE: 98 F | SYSTOLIC BLOOD PRESSURE: 189 MMHG | DIASTOLIC BLOOD PRESSURE: 99 MMHG | OXYGEN SATURATION: 99 % | RESPIRATION RATE: 16 BRPM

## 2021-05-14 PROCEDURE — 74177 CT ABD & PELVIS W/CONTRAST: CPT | Mod: 26

## 2021-05-14 RX ORDER — METRONIDAZOLE 500 MG
500 TABLET ORAL ONCE
Refills: 0 | Status: COMPLETED | OUTPATIENT
Start: 2021-05-14 | End: 2021-05-14

## 2021-05-14 RX ORDER — MOXIFLOXACIN HYDROCHLORIDE TABLETS, 400 MG 400 MG/1
1 TABLET, FILM COATED ORAL
Qty: 20 | Refills: 0
Start: 2021-05-14 | End: 2021-05-23

## 2021-05-14 RX ORDER — CIPROFLOXACIN LACTATE 400MG/40ML
500 VIAL (ML) INTRAVENOUS ONCE
Refills: 0 | Status: COMPLETED | OUTPATIENT
Start: 2021-05-14 | End: 2021-05-14

## 2021-05-14 RX ORDER — METOPROLOL TARTRATE 50 MG
50 TABLET ORAL ONCE
Refills: 0 | Status: COMPLETED | OUTPATIENT
Start: 2021-05-14 | End: 2021-05-14

## 2021-05-14 RX ORDER — METRONIDAZOLE 500 MG
1 TABLET ORAL
Qty: 30 | Refills: 0
Start: 2021-05-14 | End: 2021-05-23

## 2021-05-14 RX ADMIN — Medication 500 MILLIGRAM(S): at 02:37

## 2021-05-14 RX ADMIN — Medication 50 MILLIGRAM(S): at 00:32

## 2022-05-31 NOTE — ED PROVIDER NOTE - CROS ED CARDIOVAS ALL NEG
negative...
I personally performed the service described in the documentation  recorded by the scribe in my presence, and it accurately and completely records my words and action.

## 2023-02-07 NOTE — ED PROVIDER NOTE - OBJECTIVE STATEMENT
58 yr old M, smoker, c h/o HTN, who p.w severe lower abd pain which started today and a/w chills today and yesterday.  Also with nausea and vomiting today.  Suffers from chronic constipation and hemorrhoids and is having BRBPR during BMs.  Hemorrhoids are generally reducible.  States he hasn't had a BM in several days but today had some watery rectal dc which he attributed to a BM.  Has been passing some flatus.  No urinary complaints.  Has not tried any laxatioves . Took tylenol today with minimal relief.  Has never had any of these sx before, except hemorrhoids and occasional bleeding. surgical hx significant for unilateral inguinal hernia repair, unknown if mesh.
No

## 2023-03-09 NOTE — ED ADULT TRIAGE NOTE - PAIN RATING/NUMBER SCALE (0-10): REST
Medication list updated in Outpatient Medication Record (OMR).   Spoke with patient who reports to only taking medications listed in OMR.   Patient reports to mostly taking oxycodone 5mg (1 tab every 4 to 6 hours prn) for pain.   Of Note:   - iStop history shows percocet 10/325 being dispensed frequently but patient notes to "not really taking" (?)   - Gabapentin 300mg BID filled 2/27/23 x 30 day supply; but patient notes "not really taking".   - Also shows dispense history for suboxone films but patient reports he does not take them.    Medication list updated in Outpatient Medication Record (OMR). iStop reference #[id number]  Spoke with patient who reports to only taking medications listed in OMR.   Patient reports to mostly taking oxycodone 5mg (1 tab every 4 to 6 hours prn) for pain.   Of Note:   - iStop history shows percocet 10/325 being dispensed frequently but patient notes to "not really taking" (?)   - Gabapentin 300mg BID filled 2/27/23 x 30 day supply; but patient notes "not really taking".   - Also shows dispense history for suboxone films but patient reports he does not take them.    9

## 2024-01-30 ENCOUNTER — EMERGENCY (EMERGENCY)
Facility: HOSPITAL | Age: 64
LOS: 1 days | Discharge: HOME | End: 2024-01-30
Attending: EMERGENCY MEDICINE | Admitting: EMERGENCY MEDICINE
Payer: MEDICAID

## 2024-01-30 ENCOUNTER — APPOINTMENT (OUTPATIENT)
Dept: UROLOGY | Facility: CLINIC | Age: 64
End: 2024-01-30
Payer: MEDICAID

## 2024-01-30 VITALS
SYSTOLIC BLOOD PRESSURE: 167 MMHG | HEART RATE: 79 BPM | TEMPERATURE: 98 F | RESPIRATION RATE: 15 BRPM | DIASTOLIC BLOOD PRESSURE: 102 MMHG | OXYGEN SATURATION: 100 %

## 2024-01-30 VITALS
OXYGEN SATURATION: 98 % | TEMPERATURE: 97.4 F | WEIGHT: 150 LBS | BODY MASS INDEX: 24.69 KG/M2 | DIASTOLIC BLOOD PRESSURE: 80 MMHG | HEART RATE: 72 BPM | SYSTOLIC BLOOD PRESSURE: 166 MMHG | HEIGHT: 65.5 IN

## 2024-01-30 DIAGNOSIS — Z98.89 OTHER SPECIFIED POSTPROCEDURAL STATES: Chronic | ICD-10-CM

## 2024-01-30 DIAGNOSIS — Z86.79 PERSONAL HISTORY OF OTHER DISEASES OF THE CIRCULATORY SYSTEM: ICD-10-CM

## 2024-01-30 DIAGNOSIS — F17.210 NICOTINE DEPENDENCE, CIGARETTES, UNCOMPLICATED: ICD-10-CM

## 2024-01-30 DIAGNOSIS — Z12.5 ENCOUNTER FOR SCREENING FOR MALIGNANT NEOPLASM OF PROSTATE: ICD-10-CM

## 2024-01-30 PROCEDURE — 99204 OFFICE O/P NEW MOD 45 MIN: CPT

## 2024-01-30 PROCEDURE — G2211 COMPLEX E/M VISIT ADD ON: CPT

## 2024-01-30 PROCEDURE — 99285 EMERGENCY DEPT VISIT HI MDM: CPT

## 2024-01-30 RX ORDER — TAMSULOSIN HYDROCHLORIDE 0.4 MG/1
0.4 CAPSULE ORAL
Qty: 30 | Refills: 1 | Status: ACTIVE | COMMUNITY
Start: 2024-01-30 | End: 1900-01-01

## 2024-01-30 RX ORDER — LISINOPRIL 20 MG/1
20 TABLET ORAL
Refills: 0 | Status: ACTIVE | COMMUNITY

## 2024-01-30 NOTE — ED ADULT TRIAGE NOTE - PATIENT ON (OXYGEN DELIVERY METHOD)
Pt admitted to unit in stable condition.   RN attempted to orientate pt to room and call light. Pt is not agreeable at this time but demonstrates ability to use call light.   Pt aggressive and demanding cares, while refusing to allow rn to start admission process.   Pt threatening to pull out NG tube and \"fired\" RN.  RN notified charge nurse and doctors of events. MD to respond bedside.   RN will continue to monitor pt from a far until new staffing can be aranged.    room air

## 2024-01-30 NOTE — PHYSICAL EXAM
[Normal Appearance] : normal appearance [Well Groomed] : well groomed [General Appearance - In No Acute Distress] : no acute distress [Edema] : no peripheral edema [Respiration, Rhythm And Depth] : normal respiratory rhythm and effort [Exaggerated Use Of Accessory Muscles For Inspiration] : no accessory muscle use [Abdomen Soft] : soft [Abdomen Tenderness] : non-tender [Costovertebral Angle Tenderness] : no ~M costovertebral angle tenderness [Normal Station and Gait] : the gait and station were normal for the patient's age [] : no rash [No Focal Deficits] : no focal deficits [Oriented To Time, Place, And Person] : oriented to person, place, and time [Affect] : the affect was normal [Mood] : the mood was normal [No Palpable Adenopathy] : no palpable adenopathy [de-identified] : MARVIN deferred due to bleeding per rectum after colonoscopy and hemorrhoid procedure

## 2024-01-30 NOTE — ED ADULT TRIAGE NOTE - CHIEF COMPLAINT QUOTE
presents C/O rectal bleeding S/P colonoscopy 1/23. denies AC use. No complaints of chest pain, headache, nausea, dizziness, vomiting  SOB, fever, chills verbalized. Phx HTN

## 2024-01-30 NOTE — ASSESSMENT
[FreeTextEntry1] : Very pleasant 63-year-old gentleman who presents for evaluation of BPH, screening for prostate cancer -Discussed the natural history of BPH, as well as typical symptoms of an enlarged prostate. Discussed potential complications that could arise from BPH, including but not limited to urinary tract infections, bladder stones, and renal impairment. -Trial of Flomax -I discussed the risks, benefits, alternatives, and possible side effects of alpha blocker therapy with the patient, including but not limited to dizziness, lightheadedness, headache, blurred vision, retrograde ejaculation, and priapism with the patient. I also discussed that the patient must inform his ophthalmologist that he has taken an alpha blocker prior to undergoing cataract or glaucoma surgery. -PSA -BMP -A1c -Follow-up in 1 month  Patient is being seen today for evaluation and management of a chronic and longitudinal ongoing condition and I am of the primary treating physician

## 2024-01-30 NOTE — HISTORY OF PRESENT ILLNESS
[FreeTextEntry1] : Very pleasant 63-year-old gentleman who presents for evaluation of BPH, screening for prostate cancer.  He was recently told that he had an enlarged prostate.  Review of records demonstrates that he had a CT scan in 2021 which demonstrated an enlarged prostate.  He reports dribbling of urination at times.  He reports a weak urinary stream at times.  Nocturia x 1.  No dysuria.  No flank pain or suprapubic pain.  He reports that he recently had a colonoscopy and treatment of hemorrhoids.  He still reports that he has bleeding per rectum.  No family history of prostate cancer.

## 2024-01-31 VITALS
SYSTOLIC BLOOD PRESSURE: 157 MMHG | DIASTOLIC BLOOD PRESSURE: 76 MMHG | OXYGEN SATURATION: 100 % | HEART RATE: 63 BPM | TEMPERATURE: 98 F | RESPIRATION RATE: 14 BRPM

## 2024-01-31 LAB
ALBUMIN SERPL ELPH-MCNC: 3.9 G/DL — SIGNIFICANT CHANGE UP (ref 3.3–5)
ALP SERPL-CCNC: 73 U/L — SIGNIFICANT CHANGE UP (ref 40–120)
ALT FLD-CCNC: 16 U/L — SIGNIFICANT CHANGE UP (ref 4–41)
ANION GAP SERPL CALC-SCNC: 10 MMOL/L — SIGNIFICANT CHANGE UP (ref 7–14)
ANION GAP SERPL CALC-SCNC: 13 MMOL/L
APTT BLD: 31.2 SEC — SIGNIFICANT CHANGE UP (ref 24.5–35.6)
AST SERPL-CCNC: 24 U/L — SIGNIFICANT CHANGE UP (ref 4–40)
BASOPHILS # BLD AUTO: 0.04 K/UL — SIGNIFICANT CHANGE UP (ref 0–0.2)
BASOPHILS NFR BLD AUTO: 0.4 % — SIGNIFICANT CHANGE UP (ref 0–2)
BILIRUB SERPL-MCNC: 0.4 MG/DL — SIGNIFICANT CHANGE UP (ref 0.2–1.2)
BLD GP AB SCN SERPL QL: NEGATIVE — SIGNIFICANT CHANGE UP
BUN SERPL-MCNC: 15 MG/DL
BUN SERPL-MCNC: 15 MG/DL — SIGNIFICANT CHANGE UP (ref 7–23)
CALCIUM SERPL-MCNC: 8.7 MG/DL
CALCIUM SERPL-MCNC: 8.9 MG/DL — SIGNIFICANT CHANGE UP (ref 8.4–10.5)
CHLORIDE SERPL-SCNC: 102 MMOL/L
CHLORIDE SERPL-SCNC: 102 MMOL/L — SIGNIFICANT CHANGE UP (ref 98–107)
CO2 SERPL-SCNC: 26 MMOL/L
CO2 SERPL-SCNC: 28 MMOL/L — SIGNIFICANT CHANGE UP (ref 22–31)
CREAT SERPL-MCNC: 1.1 MG/DL
CREAT SERPL-MCNC: 1.12 MG/DL — SIGNIFICANT CHANGE UP (ref 0.5–1.3)
EGFR: 75 ML/MIN/1.73M2 — SIGNIFICANT CHANGE UP
EGFR: 76 ML/MIN/1.73M2
EOSINOPHIL # BLD AUTO: 0.15 K/UL — SIGNIFICANT CHANGE UP (ref 0–0.5)
EOSINOPHIL NFR BLD AUTO: 1.6 % — SIGNIFICANT CHANGE UP (ref 0–6)
ESTIMATED AVERAGE GLUCOSE: 108 MG/DL
GLUCOSE SERPL-MCNC: 115 MG/DL — HIGH (ref 70–99)
GLUCOSE SERPL-MCNC: 82 MG/DL
HBA1C MFR BLD HPLC: 5.4 %
HCT VFR BLD CALC: 42.5 % — SIGNIFICANT CHANGE UP (ref 39–50)
HGB BLD-MCNC: 14.9 G/DL — SIGNIFICANT CHANGE UP (ref 13–17)
IANC: 5.7 K/UL — SIGNIFICANT CHANGE UP (ref 1.8–7.4)
IMM GRANULOCYTES NFR BLD AUTO: 0.3 % — SIGNIFICANT CHANGE UP (ref 0–0.9)
INR BLD: 1.02 RATIO — SIGNIFICANT CHANGE UP (ref 0.85–1.18)
LYMPHOCYTES # BLD AUTO: 2.69 K/UL — SIGNIFICANT CHANGE UP (ref 1–3.3)
LYMPHOCYTES # BLD AUTO: 29 % — SIGNIFICANT CHANGE UP (ref 13–44)
MCHC RBC-ENTMCNC: 30.5 PG — SIGNIFICANT CHANGE UP (ref 27–34)
MCHC RBC-ENTMCNC: 35.1 GM/DL — SIGNIFICANT CHANGE UP (ref 32–36)
MCV RBC AUTO: 86.9 FL — SIGNIFICANT CHANGE UP (ref 80–100)
MONOCYTES # BLD AUTO: 0.67 K/UL — SIGNIFICANT CHANGE UP (ref 0–0.9)
MONOCYTES NFR BLD AUTO: 7.2 % — SIGNIFICANT CHANGE UP (ref 2–14)
NEUTROPHILS # BLD AUTO: 5.7 K/UL — SIGNIFICANT CHANGE UP (ref 1.8–7.4)
NEUTROPHILS NFR BLD AUTO: 61.5 % — SIGNIFICANT CHANGE UP (ref 43–77)
NRBC # BLD: 0 /100 WBCS — SIGNIFICANT CHANGE UP (ref 0–0)
NRBC # FLD: 0 K/UL — SIGNIFICANT CHANGE UP (ref 0–0)
PLATELET # BLD AUTO: 190 K/UL — SIGNIFICANT CHANGE UP (ref 150–400)
POTASSIUM SERPL-MCNC: 3.7 MMOL/L — SIGNIFICANT CHANGE UP (ref 3.5–5.3)
POTASSIUM SERPL-SCNC: 3.7 MMOL/L — SIGNIFICANT CHANGE UP (ref 3.5–5.3)
POTASSIUM SERPL-SCNC: 4.5 MMOL/L
PROT SERPL-MCNC: 6.5 G/DL — SIGNIFICANT CHANGE UP (ref 6–8.3)
PROTHROM AB SERPL-ACNC: 11.4 SEC — SIGNIFICANT CHANGE UP (ref 9.5–13)
PSA SERPL-MCNC: 6.81 NG/ML
RBC # BLD: 4.89 M/UL — SIGNIFICANT CHANGE UP (ref 4.2–5.8)
RBC # FLD: 12.8 % — SIGNIFICANT CHANGE UP (ref 10.3–14.5)
RH IG SCN BLD-IMP: POSITIVE — SIGNIFICANT CHANGE UP
SODIUM SERPL-SCNC: 140 MMOL/L — SIGNIFICANT CHANGE UP (ref 135–145)
SODIUM SERPL-SCNC: 141 MMOL/L
WBC # BLD: 9.28 K/UL — SIGNIFICANT CHANGE UP (ref 3.8–10.5)
WBC # FLD AUTO: 9.28 K/UL — SIGNIFICANT CHANGE UP (ref 3.8–10.5)

## 2024-01-31 PROCEDURE — 74178 CT ABD&PLV WO CNTR FLWD CNTR: CPT | Mod: 26,MA

## 2024-01-31 RX ORDER — SODIUM CHLORIDE 9 MG/ML
1000 INJECTION INTRAMUSCULAR; INTRAVENOUS; SUBCUTANEOUS ONCE
Refills: 0 | Status: COMPLETED | OUTPATIENT
Start: 2024-01-31 | End: 2024-01-31

## 2024-01-31 RX ADMIN — SODIUM CHLORIDE 1000 MILLILITER(S): 9 INJECTION INTRAMUSCULAR; INTRAVENOUS; SUBCUTANEOUS at 01:35

## 2024-01-31 NOTE — ED PROVIDER NOTE - PROGRESS NOTE DETAILS
LANDRY: Pt H&H is WNL, no need for transfusion. Labs otherwise unremarkable. Still pending CTA read by rads. Likely discharge if normal to f/u with PMD/GI MD. NOMAN Jaimes MD - PGY-5: rectal exam chaperoned by Dr Gonzalez shows an external hemorrhoid.  No gross bleeding. GRIS: Patient's labs reviewed and are unremarkable.  Patient's blood type is AB+ which patient was made aware.  CT abdomen pelvis with CT contrast shows no angiographic evidence of active GI bleeding or hemorrhage.  Patient was made aware of findings and recommended follow-up with his GI doctor outpatient and his PMD.  Return precautions explained and understood.

## 2024-01-31 NOTE — ED PROVIDER NOTE - OBJECTIVE STATEMENT
63-year-old male with history of diverticulosis but no other medical issues including not on anticoagulation that presents with GI bleed.  Patient states that he had a colonoscopy on January 23 with Dr. ruiz and since he has had GI bleeding since.  Patient states initially was bright red but in the last 2 days has subsided and slowing down and now only with wiping.  Has some lightheadedness and headache which is unusual for him but no loss of consciousness or syncope and no chest pain or shortness of breath or palpitations.  Denies any weakness.  Has not been eating normally but not because he is nauseous so weak.  Denies any fevers, chills.  He states that his colonoscopy this was his third 1 and had polyps and hemorrhoids removed and banded which he had in the past as well.  Never cancerous.  Non-smoker no drinking no drugs.  He went to his urologist today who told him to come to the ED for evaluation for GI bleed.  Patient denies any abdominal pain, nausea, vomiting, diarrhea.

## 2024-01-31 NOTE — ED PROVIDER NOTE - ATTENDING CONTRIBUTION TO CARE
I, Dr Earl Gonzalez wrote the initial note in its entirety and the resident only contributed to the progress note and disposition with which I agree.

## 2024-01-31 NOTE — ED PROVIDER NOTE - NSFOLLOWUPINSTRUCTIONS_ED_ALL_ED_FT
You were seen in the ED for GI bleeding after colonoscopy.      We obtained labs and imaging.    Your labs did not show any significant anemia where he needed or required blood transfusion.    Your CTA did not show any active bleeding at this time.    It was determined that you have no life threatening injuries or condition that requires you to be admitted to the hospital or have any additional testing while in the ED.     Attached are your results from todays visit.     Please take these results to follow up with your primary care doctor so that they can determine if you need any additional testing or treatment as an outpatient.     If your symptoms worsen or change, you can return to the ED for further evaluation and care at that time.
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
## 2024-01-31 NOTE — ED PROVIDER NOTE - PATIENT PORTAL LINK FT
You can access the FollowMyHealth Patient Portal offered by [facility] by registering at the following website: [email or web address]. By joining [employer]’s FollowMyHealth portal, you will also be able to view your health information using other applications (apps) compatible with our system.

## 2024-01-31 NOTE — ED ADULT NURSE REASSESSMENT NOTE - NS ED NURSE REASSESS COMMENT FT1
BREAK RN: pt a&ox4 with no new complaints. pt pending CT read and dispo. pt resting comfortably in stretcher at this time. safety maintained

## 2024-01-31 NOTE — ED PROVIDER NOTE - CLINICAL SUMMARY MEDICAL DECISION MAKING FREE TEXT BOX
63-year-old male with history of diverticulosis but no other medical issues and not on anticoagulation that recently had a colonoscopy with polyps removed and hemorrhoid banding that comes in with GI bleed for 6 days.  Patient states that the bleeding is slowing down.  Abdomen soft nontender.  At this time will obtain labs to check for anemia and obtain CTA to see for active bleed although unlikely.  Will reassess after workup in the ED.  Will also provide IV fluids in the meantime for hydration.

## 2024-01-31 NOTE — ED PROVIDER NOTE - NSFOLLOWUPCLINICS_GEN_ALL_ED_FT
[facility] Specialty Clinics  General Surgery  [address] [place] [address] - 3rd [place] [address]  Phone: [phone]  Fax:   Follow Up Time: Routine

## 2024-02-16 ENCOUNTER — APPOINTMENT (OUTPATIENT)
Dept: UROLOGY | Facility: CLINIC | Age: 64
End: 2024-02-16
Payer: MEDICAID

## 2024-02-16 VITALS — SYSTOLIC BLOOD PRESSURE: 192 MMHG | HEART RATE: 75 BPM | DIASTOLIC BLOOD PRESSURE: 91 MMHG

## 2024-02-16 VITALS
OXYGEN SATURATION: 97 % | BODY MASS INDEX: 25.16 KG/M2 | SYSTOLIC BLOOD PRESSURE: 178 MMHG | WEIGHT: 151 LBS | TEMPERATURE: 97.9 F | HEIGHT: 65 IN | DIASTOLIC BLOOD PRESSURE: 86 MMHG | HEART RATE: 77 BPM

## 2024-02-16 VITALS — SYSTOLIC BLOOD PRESSURE: 185 MMHG | DIASTOLIC BLOOD PRESSURE: 98 MMHG

## 2024-02-16 VITALS — DIASTOLIC BLOOD PRESSURE: 91 MMHG | SYSTOLIC BLOOD PRESSURE: 208 MMHG

## 2024-02-16 VITALS — DIASTOLIC BLOOD PRESSURE: 91 MMHG | SYSTOLIC BLOOD PRESSURE: 182 MMHG

## 2024-02-16 PROCEDURE — 76872 US TRANSRECTAL: CPT

## 2024-02-16 PROCEDURE — 76942 ECHO GUIDE FOR BIOPSY: CPT | Mod: 59

## 2024-02-16 PROCEDURE — 55700: CPT | Mod: 22

## 2024-02-23 LAB — PROSTATE BIOPSY: NORMAL

## 2024-02-26 ENCOUNTER — APPOINTMENT (OUTPATIENT)
Dept: SURGERY | Facility: HOSPITAL | Age: 64
End: 2024-02-26

## 2024-02-28 ENCOUNTER — APPOINTMENT (OUTPATIENT)
Dept: UROLOGY | Facility: CLINIC | Age: 64
End: 2024-02-28
Payer: MEDICAID

## 2024-02-28 VITALS
DIASTOLIC BLOOD PRESSURE: 96 MMHG | OXYGEN SATURATION: 98 % | TEMPERATURE: 96.8 F | HEART RATE: 80 BPM | SYSTOLIC BLOOD PRESSURE: 165 MMHG | HEIGHT: 65 IN | WEIGHT: 151 LBS | BODY MASS INDEX: 25.16 KG/M2

## 2024-02-28 DIAGNOSIS — R97.20 ELEVATED PROSTATE, SPECIFIC ANTIGEN [PSA]: ICD-10-CM

## 2024-02-28 DIAGNOSIS — N40.1 BENIGN PROSTATIC HYPERPLASIA WITH LOWER URINARY TRACT SYMPMS: ICD-10-CM

## 2024-02-28 DIAGNOSIS — N13.8 BENIGN PROSTATIC HYPERPLASIA WITH LOWER URINARY TRACT SYMPMS: ICD-10-CM

## 2024-02-28 PROCEDURE — G2211 COMPLEX E/M VISIT ADD ON: CPT | Mod: NC,1L

## 2024-02-28 PROCEDURE — 99213 OFFICE O/P EST LOW 20 MIN: CPT

## 2024-02-28 NOTE — PHYSICAL EXAM
[Normal Appearance] : normal appearance [Well Groomed] : well groomed [Edema] : no peripheral edema [General Appearance - In No Acute Distress] : no acute distress [Respiration, Rhythm And Depth] : normal respiratory rhythm and effort [Exaggerated Use Of Accessory Muscles For Inspiration] : no accessory muscle use [Abdomen Soft] : soft [Abdomen Tenderness] : non-tender [Urinary Bladder Findings] : the bladder was normal on palpation [Costovertebral Angle Tenderness] : no ~M costovertebral angle tenderness [Normal Station and Gait] : the gait and station were normal for the patient's age [] : no rash [Oriented To Time, Place, And Person] : oriented to person, place, and time [No Focal Deficits] : no focal deficits [Affect] : the affect was normal [Mood] : the mood was normal [No Palpable Adenopathy] : no palpable adenopathy
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
## 2024-02-28 NOTE — HISTORY OF PRESENT ILLNESS
[FreeTextEntry1] : Very pleasant 63-year-old gentleman who presents for follow-up of elevated PSA, BPH.  He underwent a prostate biopsy last week, however unfortunately this examination had to be terminated early due to significant hypertension.  He feels better now.  Results of biopsy demonstrated benign prostate tissue in all 5 cores.  He reports no problems after the biopsy.

## 2024-03-01 ENCOUNTER — APPOINTMENT (OUTPATIENT)
Dept: UROLOGY | Facility: CLINIC | Age: 64
End: 2024-03-01

## 2024-05-08 ENCOUNTER — APPOINTMENT (OUTPATIENT)
Dept: UROLOGY | Facility: CLINIC | Age: 64
End: 2024-05-08

## 2024-07-12 ENCOUNTER — INPATIENT (INPATIENT)
Facility: HOSPITAL | Age: 64
LOS: 2 days | Discharge: HOME | End: 2024-07-15
Attending: HOSPITALIST | Admitting: HOSPITALIST
Payer: MEDICAID

## 2024-07-12 VITALS
HEART RATE: 101 BPM | RESPIRATION RATE: 16 BRPM | OXYGEN SATURATION: 100 % | TEMPERATURE: 98 F | SYSTOLIC BLOOD PRESSURE: 98 MMHG | WEIGHT: 143.08 LBS | DIASTOLIC BLOOD PRESSURE: 69 MMHG

## 2024-07-12 DIAGNOSIS — N40.0 BENIGN PROSTATIC HYPERPLASIA WITHOUT LOWER URINARY TRACT SYMPTOMS: ICD-10-CM

## 2024-07-12 DIAGNOSIS — I10 ESSENTIAL (PRIMARY) HYPERTENSION: ICD-10-CM

## 2024-07-12 DIAGNOSIS — A41.9 SEPSIS, UNSPECIFIED ORGANISM: ICD-10-CM

## 2024-07-12 DIAGNOSIS — D72.829 ELEVATED WHITE BLOOD CELL COUNT, UNSPECIFIED: ICD-10-CM

## 2024-07-12 DIAGNOSIS — R82.90 UNSPECIFIED ABNORMAL FINDINGS IN URINE: ICD-10-CM

## 2024-07-12 DIAGNOSIS — E87.5 HYPERKALEMIA: ICD-10-CM

## 2024-07-12 DIAGNOSIS — E87.1 HYPO-OSMOLALITY AND HYPONATREMIA: ICD-10-CM

## 2024-07-12 DIAGNOSIS — R79.89 OTHER SPECIFIED ABNORMAL FINDINGS OF BLOOD CHEMISTRY: ICD-10-CM

## 2024-07-12 DIAGNOSIS — N17.9 ACUTE KIDNEY FAILURE, UNSPECIFIED: ICD-10-CM

## 2024-07-12 DIAGNOSIS — Z29.9 ENCOUNTER FOR PROPHYLACTIC MEASURES, UNSPECIFIED: ICD-10-CM

## 2024-07-12 DIAGNOSIS — R19.7 DIARRHEA, UNSPECIFIED: ICD-10-CM

## 2024-07-12 DIAGNOSIS — N39.0 URINARY TRACT INFECTION, SITE NOT SPECIFIED: ICD-10-CM

## 2024-07-12 DIAGNOSIS — Z98.89 OTHER SPECIFIED POSTPROCEDURAL STATES: Chronic | ICD-10-CM

## 2024-07-12 LAB
ALBUMIN SERPL ELPH-MCNC: 3.2 G/DL — LOW (ref 3.3–5)
ALBUMIN SERPL ELPH-MCNC: 3.6 G/DL — SIGNIFICANT CHANGE UP (ref 3.3–5)
ALP SERPL-CCNC: 124 U/L — HIGH (ref 40–120)
ALP SERPL-CCNC: 137 U/L — HIGH (ref 40–120)
ALT FLD-CCNC: 38 U/L — SIGNIFICANT CHANGE UP (ref 4–41)
ALT FLD-CCNC: 48 U/L — HIGH (ref 4–41)
ANION GAP SERPL CALC-SCNC: 17 MMOL/L — HIGH (ref 7–14)
ANION GAP SERPL CALC-SCNC: 18 MMOL/L — HIGH (ref 7–14)
APPEARANCE UR: ABNORMAL
APTT BLD: 33.3 SEC — SIGNIFICANT CHANGE UP (ref 24.5–35.6)
AST SERPL-CCNC: 58 U/L — HIGH (ref 4–40)
AST SERPL-CCNC: 81 U/L — HIGH (ref 4–40)
BACTERIA # UR AUTO: ABNORMAL /HPF
BASOPHILS # BLD AUTO: 0 K/UL — SIGNIFICANT CHANGE UP (ref 0–0.2)
BASOPHILS NFR BLD AUTO: 0 % — SIGNIFICANT CHANGE UP (ref 0–2)
BILIRUB SERPL-MCNC: 1.8 MG/DL — HIGH (ref 0.2–1.2)
BILIRUB SERPL-MCNC: 2.5 MG/DL — HIGH (ref 0.2–1.2)
BILIRUB UR-MCNC: ABNORMAL
BLOOD GAS VENOUS COMPREHENSIVE RESULT: SIGNIFICANT CHANGE UP
BLOOD GAS VENOUS COMPREHENSIVE RESULT: SIGNIFICANT CHANGE UP
BUN SERPL-MCNC: 44 MG/DL — HIGH (ref 7–23)
BUN SERPL-MCNC: 44 MG/DL — HIGH (ref 7–23)
CALCIUM SERPL-MCNC: 8.6 MG/DL — SIGNIFICANT CHANGE UP (ref 8.4–10.5)
CALCIUM SERPL-MCNC: 9.1 MG/DL — SIGNIFICANT CHANGE UP (ref 8.4–10.5)
CAST: 10 /LPF — SIGNIFICANT CHANGE UP (ref 0–4)
CHLORIDE SERPL-SCNC: 96 MMOL/L — LOW (ref 98–107)
CHLORIDE SERPL-SCNC: 97 MMOL/L — LOW (ref 98–107)
CK SERPL-CCNC: 111 U/L — SIGNIFICANT CHANGE UP (ref 30–200)
CO2 SERPL-SCNC: 17 MMOL/L — LOW (ref 22–31)
CO2 SERPL-SCNC: 18 MMOL/L — LOW (ref 22–31)
COLOR SPEC: SIGNIFICANT CHANGE UP
CREAT SERPL-MCNC: 2.32 MG/DL — HIGH (ref 0.5–1.3)
CREAT SERPL-MCNC: 2.67 MG/DL — HIGH (ref 0.5–1.3)
DIFF PNL FLD: ABNORMAL
EGFR: 26 ML/MIN/1.73M2 — LOW
EGFR: 31 ML/MIN/1.73M2 — LOW
EOSINOPHIL # BLD AUTO: 0 K/UL — SIGNIFICANT CHANGE UP (ref 0–0.5)
EOSINOPHIL NFR BLD AUTO: 0 % — SIGNIFICANT CHANGE UP (ref 0–6)
GLUCOSE SERPL-MCNC: 79 MG/DL — SIGNIFICANT CHANGE UP (ref 70–99)
GLUCOSE SERPL-MCNC: 88 MG/DL — SIGNIFICANT CHANGE UP (ref 70–99)
GLUCOSE UR QL: NEGATIVE MG/DL — SIGNIFICANT CHANGE UP
HCT VFR BLD CALC: 44.3 % — SIGNIFICANT CHANGE UP (ref 39–50)
HGB BLD-MCNC: 16.2 G/DL — SIGNIFICANT CHANGE UP (ref 13–17)
IANC: 23.77 K/UL — HIGH (ref 1.8–7.4)
INR BLD: 1.13 RATIO — SIGNIFICANT CHANGE UP (ref 0.85–1.18)
KETONES UR-MCNC: 15 MG/DL
LEUKOCYTE ESTERASE UR-ACNC: ABNORMAL
LIDOCAIN IGE QN: 20 U/L — SIGNIFICANT CHANGE UP (ref 7–60)
LYMPHOCYTES # BLD AUTO: 0.25 K/UL — LOW (ref 1–3.3)
LYMPHOCYTES # BLD AUTO: 0.9 % — LOW (ref 13–44)
MCHC RBC-ENTMCNC: 31 PG — SIGNIFICANT CHANGE UP (ref 27–34)
MCHC RBC-ENTMCNC: 36.6 GM/DL — HIGH (ref 32–36)
MCV RBC AUTO: 84.9 FL — SIGNIFICANT CHANGE UP (ref 80–100)
MONOCYTES # BLD AUTO: 2.39 K/UL — HIGH (ref 0–0.9)
MONOCYTES NFR BLD AUTO: 8.7 % — SIGNIFICANT CHANGE UP (ref 2–14)
NEUTROPHILS # BLD AUTO: 24.39 K/UL — HIGH (ref 1.8–7.4)
NEUTROPHILS NFR BLD AUTO: 79.1 % — HIGH (ref 43–77)
NITRITE UR-MCNC: POSITIVE
PH UR: 5 — SIGNIFICANT CHANGE UP (ref 5–8)
PLATELET # BLD AUTO: 180 K/UL — SIGNIFICANT CHANGE UP (ref 150–400)
POTASSIUM SERPL-MCNC: 5 MMOL/L — SIGNIFICANT CHANGE UP (ref 3.5–5.3)
POTASSIUM SERPL-MCNC: 5.6 MMOL/L — HIGH (ref 3.5–5.3)
POTASSIUM SERPL-SCNC: 5 MMOL/L — SIGNIFICANT CHANGE UP (ref 3.5–5.3)
POTASSIUM SERPL-SCNC: 5.6 MMOL/L — HIGH (ref 3.5–5.3)
PROT SERPL-MCNC: 7.1 G/DL — SIGNIFICANT CHANGE UP (ref 6–8.3)
PROT SERPL-MCNC: 8.3 G/DL — SIGNIFICANT CHANGE UP (ref 6–8.3)
PROT UR-MCNC: 100 MG/DL
PROTHROM AB SERPL-ACNC: 12.6 SEC — SIGNIFICANT CHANGE UP (ref 9.5–13)
RBC # BLD: 5.22 M/UL — SIGNIFICANT CHANGE UP (ref 4.2–5.8)
RBC # FLD: 13.2 % — SIGNIFICANT CHANGE UP (ref 10.3–14.5)
RBC CASTS # UR COMP ASSIST: 4 /HPF — SIGNIFICANT CHANGE UP (ref 0–4)
RBC CASTS # UR COMP ASSIST: PRESENT
SODIUM SERPL-SCNC: 131 MMOL/L — LOW (ref 135–145)
SODIUM SERPL-SCNC: 132 MMOL/L — LOW (ref 135–145)
SP GR SPEC: 1.03 — HIGH (ref 1–1.03)
SQUAMOUS # UR AUTO: 8 /HPF — HIGH (ref 0–5)
UROBILINOGEN FLD QL: 1 MG/DL — SIGNIFICANT CHANGE UP (ref 0.2–1)
WBC # BLD: 27.5 K/UL — HIGH (ref 3.8–10.5)
WBC # FLD AUTO: 27.5 K/UL — HIGH (ref 3.8–10.5)
WBC CASTS UR QL COMP ASSIST: PRESENT
WBC UR QL: 105 /HPF — HIGH (ref 0–5)

## 2024-07-12 PROCEDURE — 99285 EMERGENCY DEPT VISIT HI MDM: CPT

## 2024-07-12 PROCEDURE — 74177 CT ABD & PELVIS W/CONTRAST: CPT | Mod: 26,MC

## 2024-07-12 PROCEDURE — 71045 X-RAY EXAM CHEST 1 VIEW: CPT | Mod: 26

## 2024-07-12 PROCEDURE — 99223 1ST HOSP IP/OBS HIGH 75: CPT | Mod: GC

## 2024-07-12 RX ORDER — CEFTRIAXONE SODIUM 500 MG
1000 VIAL (EA) INJECTION ONCE
Refills: 0 | Status: COMPLETED | OUTPATIENT
Start: 2024-07-12 | End: 2024-07-12

## 2024-07-12 RX ORDER — HEPARIN SODIUM 50 [USP'U]/ML
5000 INJECTION, SOLUTION INTRAVENOUS EVERY 8 HOURS
Refills: 0 | Status: DISCONTINUED | OUTPATIENT
Start: 2024-07-12 | End: 2024-07-15

## 2024-07-12 RX ORDER — SODIUM CHLORIDE 0.9 % (FLUSH) 0.9 %
1000 SYRINGE (ML) INJECTION
Refills: 0 | Status: DISCONTINUED | OUTPATIENT
Start: 2024-07-12 | End: 2024-07-13

## 2024-07-12 RX ORDER — HEPARIN SODIUM 50 [USP'U]/ML
5000 INJECTION, SOLUTION INTRAVENOUS EVERY 12 HOURS
Refills: 0 | Status: DISCONTINUED | OUTPATIENT
Start: 2024-07-12 | End: 2024-07-12

## 2024-07-12 RX ORDER — DEXTROSE MONOHYDRATE AND SODIUM CHLORIDE 5; .3 G/100ML; G/100ML
1000 INJECTION, SOLUTION INTRAVENOUS ONCE
Refills: 0 | Status: COMPLETED | OUTPATIENT
Start: 2024-07-12 | End: 2024-07-12

## 2024-07-12 RX ORDER — CEFTRIAXONE SODIUM 500 MG
1000 VIAL (EA) INJECTION EVERY 24 HOURS
Refills: 0 | Status: DISCONTINUED | OUTPATIENT
Start: 2024-07-13 | End: 2024-07-15

## 2024-07-12 RX ADMIN — Medication 100 MILLIGRAM(S): at 23:12

## 2024-07-12 RX ADMIN — Medication 100 MILLIGRAM(S): at 16:10

## 2024-07-12 RX ADMIN — DEXTROSE MONOHYDRATE AND SODIUM CHLORIDE 1000 MILLILITER(S): 5; .3 INJECTION, SOLUTION INTRAVENOUS at 12:58

## 2024-07-12 RX ADMIN — HEPARIN SODIUM 5000 UNIT(S): 50 INJECTION, SOLUTION INTRAVENOUS at 21:17

## 2024-07-12 RX ADMIN — Medication 80 MILLILITER(S): at 19:53

## 2024-07-12 RX ADMIN — DEXTROSE MONOHYDRATE AND SODIUM CHLORIDE 1000 MILLILITER(S): 5; .3 INJECTION, SOLUTION INTRAVENOUS at 15:33

## 2024-07-12 NOTE — ED ADULT NURSE NOTE - NSFALLUNIVINTERV_ED_ALL_ED
Bed/Stretcher in lowest position, wheels locked, appropriate side rails in place/Call bell, personal items and telephone in reach/Instruct patient to call for assistance before getting out of bed/chair/stretcher/Non-slip footwear applied when patient is off stretcher/[place] to call system/Physically safe environment - no spills, clutter or unnecessary equipment/Purposeful proactive rounding/Room/bathroom lighting operational, light cord in reach

## 2024-07-12 NOTE — H&P ADULT - HISTORY OF PRESENT ILLNESS
28M with hypertension and Hx of lower GI bleed presented to [facility] ED for R-sided abdominal pain and diarrhea. The patient has had 2 days of watery stools w/o melena or gabino blood multiple times a day, and also endorses increased urinary frequency.      28M with hypertension, BPH, Hx of lower GI bleed presented to [facility] ED for R-sided abdominal pain and diarrhea. Patients notes the pain started on Monday. The patient has intermittent 7/10 R-sided abdominal pain with no radiation. The pain does not change with activity or with eating. Patient also endorses increased urinary frequency, noting that over the past several nights he would have to awake every 30 minutes to urinate. He notes significant urgency that at times he would be able to make it due to the bathroom. The patient also feels that he is retaining urine and feels he cannot empty his entire bladder.  The patient has had 2 days of watery stools w/o melena or gabino blood that was worse in the beginning of the weak, but has improved. He also endorses general fatigue over the week and having an oral Temperature of 101 2 days ago. Patient notes around February/March he was diagnosed with BPH and had a biopsy. He denies nausea, vomiting, burning with urination, chest pain, no recent travel, and no sick contacts.      64M with hypertension, BPH, Hx of lower GI bleed presented to [facility] ED for R-sided abdominal pain and diarrhea. Patients notes the pain started on Monday. The patient has intermittent 7/10 R-sided abdominal pain with no radiation. The pain does not change with activity or with eating. Patient also endorses increased urinary frequency, noting that over the past several nights he would have to awake every 30 minutes to urinate. He notes significant urgency that at times he would be able to make it due to the bathroom. The patient also feels that he is retaining urine and feels he cannot empty his entire bladder.  The patient has had 2 days of watery stools w/o melena or gabino blood that was worse in the beginning of the weak, but has improved. He also endorses general fatigue over the week and having an oral Temperature of 101 2 days ago. Patient notes around February/March he was diagnosed with BPH and had a biopsy. He denies nausea, vomiting, burning with urination, chest pain, no recent travel, and no sick contacts.      64M with hypertension, BPH, Hx of lower GI bleed presented to [facility] ED for R-sided abdominal pain and diarrhea. Patients notes the pain started on Monday. The patient has intermittent 7/10 R-sided abdominal pain with no radiation. The pain does not change with activity or with eating. Patient also endorses increased urinary frequency, noting that over the past several nights he would have to awake every 30 minutes to urinate. He notes significant urgency that at times he would be able to make it due to the bathroom. The patient also feels that he is retaining urine and feels he cannot empty his entire bladder.  The patient has had 2 days of watery stools w/o melena or gabino blood that was worse in the beginning of the weak, but has improved. He also endorses general fatigue, decreased oral intake over the week and having an oral Temperature of 101 2 days ago. Patient notes around February/March he was diagnosed with BPH and had a biopsy. He denies nausea, vomiting, burning with urination, chest pain, no recent travel, and no sick contacts.
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
## 2024-07-12 NOTE — H&P ADULT - ATTENDING COMMENTS
Patient seen and examined with team in ED  Agree with above a/p by Dr HILLMAN with hypertension, BPH, Hx of lower GI bleed presented to [facility] ED for R-sided abdominal pain and diarrhea.   Patients notes the pain started on Monday. The patient has intermittent 7/10 R-sided abdominal pain with no radiation.   The pain does not change with activity or with eating. Patient also endorses increased urinary frequency, noting that over the past several nights he would have to awake every 30 minutes to urinate. He notes significant urgency that at times he would be able to make it due to the bathroom. The patient also feels that he is retaining urine and feels he cannot empty his entire bladder.  Noted he has dental bone removal on Monday 7/8/24 and noted diarrhea started after.  Notes urinary s/s at the same time  Uses Marijuana  VS on Adm T 97.6, BP 98/69,   In ED CT a/p was done and Ceftriaxone started for UTI    PE NAD  Lungs cta, cor rrr, abd soft n/t, ext neg e/c/c                        16.2   27.50 )-----------( 180      ( 12 Jul 2024 12:40 )             44.3   07-12    131<L>  |  96<L>  |  44<H>  ----------------------------<  88  5.0   |  18<L>  |  2.32<H>    Ca    8.6      12 Jul 2024 15:28    TPro  7.1  /  Alb  3.2<L>  /  TBili  1.8<H>  /  DBili  x   /  AST  58<H>  /  ALT  38  /  AlkPhos  124<H>  07-12  UA Pos nitrate and leuk  ct< from: Xray Chest 1 View AP/PA (07.12.24 @ 13:21) >  IMPRESSION:  Clear lungs.        < from: CT Abdomen and Pelvis w/ IV Cont (07.12.24 @ 15:14) >  IMPRESSION:  No acute findings.    A/P  Sepsis present on admission sec with UTI complicated  BPH, Diarrhea x 4 days.    # stool for PCR, Stool for Cdiff , stool culture   #Sepsis sec UtI complicated with BPH Ceftriaxone , f/u urine cultures and will send  blood cultures if any fevers. ivf and trend lactate.   # LFT increased sec ?? acute hep prophle  r/o hep a/b/c.  # Julian creat 2.6--> 2.3. ivf hydration. NS IV at 80 cc/hr. HOLD ACE /ARB. Avoid nephrotoxic drugs.   # Na  131. Do not correct faster 8 meq in /24 hrs , BMP in am 5 AM .   # HTN , hold BP meds and OBSERVE  #BPH observe ? benefit from Flomax ??  # DVT proph hep sq  d/w Patient and team.

## 2024-07-12 NOTE — H&P ADULT - NSHPLABSRESULTS_GEN_ALL_CORE
LABS:                          16.2   27.50 )-----------( 180      ( 2024 12:40 )             44.3     07-12    131<L>  |  96<L>  |  44<H>  ----------------------------<  88  5.0   |  18<L>  |  2.32<H>    Ca    8.6      2024 15:28    TPro  7.1  /  Alb  3.2<L>  /  TBili  1.8<H>  /  DBili  x   /  AST  58<H>  /  ALT  38  /  AlkPhos  124<H>  07-12    LIVER FUNCTIONS - ( 2024 15:28 )  Alb: 3.2 g/dL / Pro: 7.1 g/dL / ALK PHOS: 124 U/L / ALT: 38 U/L / AST: 58 U/L / GGT: x           PT/INR - ( 2024 12:40 )   PT: 12.6 sec;   INR: 1.13 ratio         PTT - ( 2024 12:40 )  PTT:33.3 sec  Urinalysis Basic - ( 2024 15:28 )    Color: Dark Yellow / Appearance: Turbid / S.035 / pH: x  Gluc: 88 mg/dL / Ketone: 15 mg/dL  / Bili: Moderate / Urobili: 1.0 mg/dL   Blood: x / Protein: 100 mg/dL / Nitrite: Positive   Leuk Esterase: Moderate / RBC: 4 /HPF /  /HPF   Sq Epi: x / Non Sq Epi: 8 /HPF / Bacteria: Few /HPF

## 2024-07-12 NOTE — H&P ADULT - PROBLEM SELECTOR PLAN 5
Likely iso dehydration    CTM while fluid resuscitation  monitor I&O Multiple episodes of watery, nonbloody diarrhea for 2 days    - Promote PO hydration  - I&O  - Stool Count  - Stool PCR Likely 2/2 dehydration from decreased PO intake  - NS 75 cc/hr  - Encourage PO intake  - monitor I&O Likely 2/2 dehydration from decreased PO intake  - NS 80 cc/hr  - Encourage PO intake  - monitor I&O

## 2024-07-12 NOTE — ED ADULT TRIAGE NOTE - BEFAST BALANCE
Physical Therapy  Visit Type: initial evaluation  Precautions:  Medical precautions: ; droplet precautions and contact precautions.   Lines:     Basic: O2  Lower Extremity:    Left:  weight bearing: as tolerated.    Right:  weight bearing: as tolerated.    SUBJECTIVE  Patient agreed to participate in therapy this date.  Pt reports that she is feeling okay  Patient / Family Goal: return home     OBJECTIVE     Oriented to person, place, time and situation     Arousal alertness: appropriate responses to stimuli  Balance (trials measured in sec unless otherwise indicted)    Standing - Firm Surface - Eyes Open: Static: modified independent Dynamic: supervision    Bed Mobility:        Supine to sit: independent    Sit to supine: independent  Transfers:    Assistive devices: gait belt and 2-wheeled walker  Gait/Ambulation:     Assistance: supervision   Assistive device: gait belt and 2-wheeled walker    Distance (ft): 35    Pattern: step through  Training Completed:    Tasks: gait training on level surfaces      Interventions     Training provided: functional ambulation    Skilled input: Verbal instruction/cues       ASSESSMENT    Impairments: activity tolerance  Functional Limitations: ambulation  Pt presently demonstrates decreased overall functional mobility with transfers and gait secondary to current illness.  Pt is able to transfer and ambulate x 30 feet in room with FWW with supervision.  Anticipate progressive improvement in mobility with medical treatment and increased activity.       Discharge Recommendations  Recommendation for Discharge: PT WI: Home,Home therapy                      Skilled therapy is required to address these limitations in attempt to maximize the patient's independence.  Predicted patient presentation: Low (stable) - Patient comorbidities and complexities, as defined above, will have little effect on progress for prescribed plan of care.    End of Session:   Location: in bed  Safety measures:  alarm system in place/re-engaged and call light within reach    PLAN    Suggestions for next session as indicated: Plan to progress with therapy focusing on ambulation distance.    PT Frequency: 3 days/week      Agreement to plan and goals: patient agrees with goals and treatment plan        GOALS  Review Date: 1/6/2022  Long Term Goals: (to be met by time of discharge from hospital)  Sit to supine: Patient will complete sit to supine independent.  Supine to sit: Patient will complete supine to sit independent.  Sit to stand: Patient will complete sit to stand transfer with gait belt and 2-wheeled walker, modified independent.   Stand pivot: Patient will complete stand pivot transfer with gait belt and 2-wheeled walker, modified independent.   Ambulation (even): Patient will ambulate on even surface for 100 feet with gait belt and 2-wheeled walker, modified independent.     Documented in the chart in the following areas: Pain. Assessment.      Therapy procedure time and total treatment time can be found documented on the Time Entry flowsheet   No

## 2024-07-12 NOTE — H&P ADULT - PROBLEM SELECTOR PLAN 6
initially hyperkalemic to 5.6, improving     Likely iso ysabel Likely 2/2 dehydration from decreased PO intake  - Encourage PO intake  - monitor I&O Likely 2/2 to medication vs infection vs acute viral hepatitis  - Acute viral hepatitis panel ordered

## 2024-07-12 NOTE — H&P ADULT - PROBLEM SELECTOR PLAN 4
Likely iso dehydration 2/2 diarrhea   Cr. 2.6, baseline ~1.1   Metabolic Acidosis w/ Bicarb 17   Hyperkalemic to 5.6    s/p 2L LR in ED     Plan: Patient with biopsy in February/March  - Start Flomax  - Start finasteride Multiple episodes of watery, nonbloody diarrhea for 2 days    - Promote PO hydration  - I&O  - Stool Count  - Stool PCR  - Stool Culture  - Stool C. diff

## 2024-07-12 NOTE — ED PROVIDER NOTE - IV ALTEPLASE INCLUSION HIDDEN
show
Showering allowed/Stairs allowed/Walking - Indoors allowed/No heavy lifting/straining/Walking - Outdoors allowed

## 2024-07-12 NOTE — H&P ADULT - PROBLEM SELECTOR PLAN 2
UA showing LE, Nitrites   no urine cell counts resulted   S/p 1g CTX in ED     Plan:  F/u UCx   Abx: Likely 2/2 to UTI  - Leukocytosis to 27.5 and tachycardic to 100 but afebrile   - UA showing LE, Nitrite  - Lactate 2.7  - S/p 2L LR in ED  - S/p 1x dose CTX (7/12) in ED   - F/u UCx, BCx, GI PCR, C.diff  - Abx as above Likely 2/2 to UTI  - Leukocytosis to 27.5 and tachycardic to 100 but afebrile   - UA showing LE, Nitrite  - Lactate 2.7  - S/p 2L LR in ED  - S/p 1x dose CTX (7/12) in ED   - F/u UCx, BCx, GI PCR, C.diff  - Start NS 75 cc/hr  - Trend Lactate  - Abx as above Likely 2/2 to UTI  - Leukocytosis to 27.5 and tachycardic to 100 but afebrile   - UA showing LE, Nitrite  - Lactate 2.7  - S/p 2L LR in ED  - S/p 1x dose CTX (7/12) in ED   - F/u UCx, BCx, GI PCR, C.diff  - Start NS 80 cc/hr  - Trend Lactate  - Abx as above

## 2024-07-12 NOTE — ED ADULT NURSE REASSESSMENT NOTE - NS ED NURSE REASSESS COMMENT FT1
Received pt in room 3. pt A&OX4, ambulatory. pt with frequent urination, abdomen pain/chest pain with n/v x a few days. pt appears to be in no distress. vitally stable. respirations are equal and nonlabored, no respiratory distress noted. NSR on cardiac monitor. denies any sob, fever, cough, headache, dizziness. 22 gauge IV placed in the right hand, labs sent. IV fluids infusing. pt stable, safety maintained. awaiting further plan.

## 2024-07-12 NOTE — ED ADULT TRIAGE NOTE - CHIEF COMPLAINT QUOTE
Pt c/o mid-abdominal pain, decreased appetite, subjective fever and diarrhea x 2 days. also endorsing discomfort to lower teeth and urinary frequency. Reports chest discomfort, midsternal beginning this morning. pmhx: htn

## 2024-07-12 NOTE — H&P ADULT - PROBLEM SELECTOR PLAN 7
DVT PPx: Heparin subQ   Gi PPx  Diet:  PT/OT:  Code Status:  Dispo: DVT PPx: Heparin subq  Diet: DASH  Code Status: Full  Dispo: pending medical course Patient with biopsy in February/March  - F/u will outpatient PCP

## 2024-07-12 NOTE — ED PROVIDER NOTE - CLINICAL SUMMARY MEDICAL DECISION MAKING FREE TEXT BOX
Patient is a 64-year-old male past medical history hypertension presenting for right abdominal pain and diarrhea. Vital signs currently within normal limits and stable including afebrile.  With right-sided abdominal pain and diarrhea, reported febrile at home.  Reports anorexia.  With right-sided abdominal tenderness without peritoneal findings.  Differential includes but not limited to appendicitis versus biliary etiology versus pyelonephritis/UTI, versus colitis.  To evaluate labs, CT, give fluids, urine, reassess.

## 2024-07-12 NOTE — ED ADULT NURSE NOTE - ALCOHOL PRE SCREEN (AUDIT - C)
"Problem: Patient Care Overview  Goal: Plan of Care Review  Outcome: Outcome(s) achieved Date Met: 07/31/19 07/31/19 2027   OTHER   Outcome Summary PT in good spirits, looking forward to going home. Denied SI/HI. Voiced \"a little \" anxiety and depression per PT. PT and daughter both verbalized understanding of D/C instructions. Status is stable.   Plan of Care Review   Progress improving   Coping/Psychosocial   Plan of Care Reviewed With patient         "
Statement Selected

## 2024-07-12 NOTE — H&P ADULT - NSHPREVIEWOFSYSTEMS_GEN_ALL_CORE
REVIEW OF SYSTEMS:    CONSTITUTIONAL:  + weakness, +fevers or chills  EYES/ENT:  No visual changes;  No vertigo or throat pain   NECK:  No pain or stiffness  RESPIRATORY:  No cough, wheezing, hemoptysis; No shortness of breath  CARDIOVASCULAR:  No chest pain or palpitations  GASTROINTESTINAL:  + abdominal pain. No nausea, vomiting, or hematemesis; +diarrhea, no constipation. No melena or hematochezia.  GENITOURINARY:  No dysuria, + frequency, no hematuria  MUSCULOSKELETAL:  FROM all extremities, normal strength, No calf tenderness  NEUROLOGICAL:  No numbness or weakness  SKIN:  No itching, rashes

## 2024-07-12 NOTE — ED ADULT NURSE NOTE - OBJECTIVE STATEMENT
Patient is a 65 yo male, phx HTN, presenting with abdominal pain, diarrhea, fever and urinary frequency starting yesterday. A&Ox4, no signs of distress, abdomen soft, tender to R side, nondistended. Patient also reports L chest pain. Denies nausea, vomiting, dysuria, SOB, cough. Lung sounds clear. Placed on cardiac monitor, NSR, VSS. Pending MD evaluation. Fall precautions maintained.

## 2024-07-12 NOTE — ED ADULT NURSE NOTE - BEFAST SCREENING
willpower alone. Treatment is necessary for depression, just like for any other illness. Feeling better takes time, and your mood will improve little by little. Stay active  · Stay busy and get outside. Take a walk, or try some other light exercise. · Talk with your doctor about an exercise program. Exercise can help with mild depression. · Go to a movie or concert. Take part in a Hoah[employer] activity or other social gathering. Go to a ball game. · Ask a friend to have dinner with you. Take care of yourself  · Eat a balanced diet with plenty of fresh fruits and vegetables, whole grains, and lean protein. If you have lost your appetite, eat small snacks rather than large meals. · Avoid drinking alcohol or using illegal drugs. Do not take medicines that have not been prescribed for you. They may interfere with medicines you may be taking for depression, or they may make your depression worse. · Take your medicines exactly as they are prescribed. You may start to feel better within 1 to 3 weeks of taking antidepressant medicine. But it can take as many as 6 to 8 weeks to see more improvement. If you have questions or concerns about your medicines, or if you do not notice any improvement by 3 weeks, talk to your doctor. · If you have any side effects from your medicine, tell your doctor. Antidepressants can make you feel tired, dizzy, or nervous. Some people have dry mouth, constipation, headaches, sexual problems, or diarrhea. Many of these side effects are mild and will go away on their own after you have been taking the medicine for a few weeks. Some may last longer. Talk to your doctor if side effects are bothering you too much. You might be able to try a different medicine. · Get enough sleep. If you have problems sleeping:  ? Go to bed at the same time every night, and get up at the same time every morning. ? Keep your bedroom dark and quiet. ? Do not exercise after 5:00 p.m.  ?  Avoid drinks with caffeine after 5:00 p.m. · Avoid sleeping pills unless they are prescribed by the doctor treating your depression. Sleeping pills may make you groggy during the day, and they may interact with other medicine you are taking. · If you have any other illnesses, such as diabetes, heart disease, or high blood pressure, make sure to continue with your treatment. Tell your doctor about all of the medicines you take, including those with or without a prescription. · Keep the numbers for these national suicide hotlines: 3-073-029-TALK (4-733.459.9154) and 1-968-TMYVCXM (4-998.211.2039). If you or someone you know talks about suicide or feeling hopeless, get help right away. When should you call for help? Call 911 anytime you think you may need emergency care. For example, call if:    · You feel like hurting yourself or someone else.     · Someone you know has depression and is about to attempt or is attempting suicide.   [facility] your doctor now or seek immediate medical care if:    · You hear voices.     · Someone you know has depression and:  ? Starts to give away his or her possessions. ? Uses illegal drugs or drinks alcohol heavily. ? Talks or writes about death, including writing suicide notes or talking about guns, knives, or pills. ? Starts to spend a lot of time alone. ? Acts very aggressively or suddenly appears calm.    Watch closely for changes in your health, and be sure to contact your doctor if:    · You do not get better as expected. Where can you learn more? Go to https://[employer]checkcar[employer]akaz.ua[employer]. org and sign in to your [employer] account. Enter S496 in the Mail[employer] box to learn more about \"Recovering From Depression: Care Instructions. \"     If you do not have an account, please click on the \"Sign Up Now\" link. Current as of: September 11, 2018  Content Version: 12.1  © 8299-8508 Healthwise, Incorporated. Care instructions adapted under license by [facility] ([facility]).  If you have questions about a Negative

## 2024-07-12 NOTE — ED PROVIDER NOTE - PROGRESS NOTE DETAILS
Radha Vasquez PGY3 - sign out - 64-year-old male presenting with RUQ and RLQ abdominal pain, diarrhea.  UA with evidence of acute urinary tract infection.  CT abdomen pelvis without acute intra-abdominal pathology.  Lab work also notable for acute kidney injury (creatinine 2.67, prior baseline WNL).  Admit to medicine for IV antibiotics and BRANDON.

## 2024-07-12 NOTE — ED PROVIDER NOTE - OBJECTIVE STATEMENT
Patient is a 64-year-old male past medical history hypertension presenting for right abdominal pain and diarrhea.  Patient states that he has had approximately 2 days of watery nonbloody stools multiple times per day, urinary frequency without dysuria or hematuria.  Also right-sided abdominal pain without nausea or vomiting, but has decreased appetite.  Pain is constant, not radiating, not worse with movement or meals.  Reports fevers at home, no cough, sick contacts, sore throat, difficulty breathing or chest pain.  Declines pain medicine at this time. No prev abdominal surgeries.

## 2024-07-12 NOTE — H&P ADULT - ASSESSMENT
28 year old man with hypertension, BPH, Hx of lower GI bleed who presented to [facility] ED for R-sided abdominal pain and diarrhea. Patient presented with tachycardia, elevated WBC, UA significant for UTI with normal CT abdomen. Patient admitted for UTI likely 2/2 with BPH and BRANDON likely 2/2 watery diarrhea and decreased PO intake.

## 2024-07-12 NOTE — H&P ADULT - NSHPPHYSICALEXAM_GEN_ALL_CORE
Vital Signs Last 24 Hrs  T(C): 36.4 (12 Jul 2024 16:12), Max: 36.4 (12 Jul 2024 10:54)  T(F): 97.6 (12 Jul 2024 16:12), Max: 97.6 (12 Jul 2024 10:54)  HR: 78 (12 Jul 2024 16:12) (78 - 101)  BP: 144/83 (12 Jul 2024 16:12) (98/69 - 144/83)  BP(mean): --  RR: 18 (12 Jul 2024 16:12) (16 - 19)  SpO2: 100% (12 Jul 2024 16:12) (100% - 100%)    Parameters below as of 12 Jul 2024 16:12  Patient On (Oxygen Delivery Method): room air        CONSTITUTIONAL: NAD, well-developed, well-groomed  EYES:; conjunctiva and sclera clear  ENMT: Moist oral mucosa, no pharyngeal injection or exudates; normal dentition  NECK: Supple, no palpable masses; no thyromegaly  RESPIRATORY: Normal respiratory effort; lungs are clear to auscultation bilaterally  CARDIOVASCULAR: Regular rate and rhythm, normal S1 and S2, no murmur/rub/gallop; No lower extremity edema  ABDOMEN: tender to the epigastrium, normoactive bowel sounds, no rebound/guarding; No hepatosplenomegaly  MUSCULOSKELETAL:  Normal gait; no clubbing or cyanosis of digits; no joint swelling or tenderness to palpation  PSYCH: A+O x 3 to person, place, and time; affect appropriate  NEUROLOGY: no gross sensory deficits   SKIN: No rashes; no palpable lesions

## 2024-07-12 NOTE — H&P ADULT - PROBLEM SELECTOR PLAN 3
Multiple episodes of watery, nonbloody diarrhea for 2 days   Recent Abx use:    Plan:   Promote PO hydration  I&O  Stool Count Likely pre-renal 2/2 to dehydration form decreased PO intake and watery diarrhea   - Cr. 2.6, baseline ~1.1   - Metabolic Acidosis w/ Bicarb 17   - Hyperkalemic to 5.6    - s/p 2L LR in ED   - Encourage PO intake  - Start DASH diet

## 2024-07-12 NOTE — ED PROVIDER NOTE - PHYSICAL EXAMINATION
CONSTITUTIONAL: awake; in no acute distress.   SKIN: warm, dry  HEAD: Normocephalic; atraumatic.  EYES: no conjunctival injection. no scleral icterus  ENT: No nasal discharge; airway clear.  CARD: S1, S2 normal; no murmurs, gallops, or rubs. Regular rate and rhythm.   RESP: No wheezes, rales or rhonchi. Good air movement bilaterally.   ABD: soft RUQ and RLQ tender without rebound, no guarding, no distention, no rigidity.   EXT: Ambulates independently.  No cyanosis or edema.   NEURO: Alert, oriented, grossly unremarkable  PSYCH: Cooperative, appropriate.

## 2024-07-12 NOTE — PATIENT PROFILE ADULT - TRANSPORTATION
----- Message from Delwyn Klinefelter sent at 12/9/2020 10:29 AM EST -----  Regarding: Dr. Luana Wang Patient return call    Caller's first and last name and relationship (if not the patient):      Best contact number(s): [phone]      Whose call is being returned: Returning missed call from practice.        Details to clarify the request:      Delwyn Klinefelter no

## 2024-07-12 NOTE — H&P ADULT - PROBLEM SELECTOR PLAN 1
GI vs.  source  Leukocytosis to 27.5 and tachycardic to 100 but afebrile   UA showing LE, Nitrite  Lactate 2.7  S/p 2L LR in ED  S/p 1x dose CTX (7/12) in ED     Plan:   -F/u UCx, BCx, GI PCR, C.diff  Abx: Likely 2/2 to BPH  - UA showing LE, Nitrites   - no urine cell counts resulted   - S/p 1g CTX in ED   - F/u UCx   - ceftriaxone 2g for 5-7 days Likely 2/2 to BPH  - UA showing LE, Nitrites   - no urine cell counts resulted   - S/p 1g CTX in ED   - F/u UCx   - ceftriaxone 1g for 5-7 days

## 2024-07-13 ENCOUNTER — TRANSCRIPTION ENCOUNTER (OUTPATIENT)
Age: 64
End: 2024-07-13

## 2024-07-13 LAB
ADV 40+41 DNA STL QL NAA+NON-PROBE: DETECTED
ALBUMIN SERPL ELPH-MCNC: 3.2 G/DL — LOW (ref 3.3–5)
ALP SERPL-CCNC: 120 U/L — SIGNIFICANT CHANGE UP (ref 40–120)
ALT FLD-CCNC: 28 U/L — SIGNIFICANT CHANGE UP (ref 4–41)
ANION GAP SERPL CALC-SCNC: 15 MMOL/L — HIGH (ref 7–14)
AST SERPL-CCNC: 27 U/L — SIGNIFICANT CHANGE UP (ref 4–40)
BASOPHILS # BLD AUTO: 0.06 K/UL — SIGNIFICANT CHANGE UP (ref 0–0.2)
BASOPHILS NFR BLD AUTO: 0.3 % — SIGNIFICANT CHANGE UP (ref 0–2)
BILIRUB SERPL-MCNC: 0.8 MG/DL — SIGNIFICANT CHANGE UP (ref 0.2–1.2)
BUN SERPL-MCNC: 34 MG/DL — HIGH (ref 7–23)
CALCIUM SERPL-MCNC: 8.5 MG/DL — SIGNIFICANT CHANGE UP (ref 8.4–10.5)
CHLORIDE SERPL-SCNC: 100 MMOL/L — SIGNIFICANT CHANGE UP (ref 98–107)
CO2 SERPL-SCNC: 18 MMOL/L — LOW (ref 22–31)
CREAT SERPL-MCNC: 1.44 MG/DL — HIGH (ref 0.5–1.3)
EGFR: 55 ML/MIN/1.73M2 — LOW
EOSINOPHIL # BLD AUTO: 0.06 K/UL — SIGNIFICANT CHANGE UP (ref 0–0.5)
EOSINOPHIL NFR BLD AUTO: 0.3 % — SIGNIFICANT CHANGE UP (ref 0–6)
GI PCR PANEL: DETECTED
GLUCOSE SERPL-MCNC: 156 MG/DL — HIGH (ref 70–99)
HCT VFR BLD CALC: 40.9 % — SIGNIFICANT CHANGE UP (ref 39–50)
HGB BLD-MCNC: 14.2 G/DL — SIGNIFICANT CHANGE UP (ref 13–17)
IANC: 16.61 K/UL — HIGH (ref 1.8–7.4)
IMM GRANULOCYTES NFR BLD AUTO: 0.8 % — SIGNIFICANT CHANGE UP (ref 0–0.9)
LYMPHOCYTES # BLD AUTO: 1.95 K/UL — SIGNIFICANT CHANGE UP (ref 1–3.3)
LYMPHOCYTES # BLD AUTO: 9.5 % — LOW (ref 13–44)
MAGNESIUM SERPL-MCNC: 2.1 MG/DL — SIGNIFICANT CHANGE UP (ref 1.6–2.6)
MCHC RBC-ENTMCNC: 29.8 PG — SIGNIFICANT CHANGE UP (ref 27–34)
MCHC RBC-ENTMCNC: 34.7 GM/DL — SIGNIFICANT CHANGE UP (ref 32–36)
MCV RBC AUTO: 85.7 FL — SIGNIFICANT CHANGE UP (ref 80–100)
MONOCYTES # BLD AUTO: 1.63 K/UL — HIGH (ref 0–0.9)
MONOCYTES NFR BLD AUTO: 8 % — SIGNIFICANT CHANGE UP (ref 2–14)
NEUTROPHILS # BLD AUTO: 16.61 K/UL — HIGH (ref 1.8–7.4)
NEUTROPHILS NFR BLD AUTO: 81.1 % — HIGH (ref 43–77)
NRBC # BLD: 0 /100 WBCS — SIGNIFICANT CHANGE UP (ref 0–0)
NRBC # FLD: 0 K/UL — SIGNIFICANT CHANGE UP (ref 0–0)
PHOSPHATE SERPL-MCNC: 2.1 MG/DL — LOW (ref 2.5–4.5)
PLATELET # BLD AUTO: 186 K/UL — SIGNIFICANT CHANGE UP (ref 150–400)
POTASSIUM SERPL-MCNC: 3.4 MMOL/L — LOW (ref 3.5–5.3)
POTASSIUM SERPL-SCNC: 3.4 MMOL/L — LOW (ref 3.5–5.3)
PROT SERPL-MCNC: 6.8 G/DL — SIGNIFICANT CHANGE UP (ref 6–8.3)
RBC # BLD: 4.77 M/UL — SIGNIFICANT CHANGE UP (ref 4.2–5.8)
RBC # FLD: 13.2 % — SIGNIFICANT CHANGE UP (ref 10.3–14.5)
SODIUM SERPL-SCNC: 133 MMOL/L — LOW (ref 135–145)
WBC # BLD: 20.47 K/UL — HIGH (ref 3.8–10.5)
WBC # FLD AUTO: 20.47 K/UL — HIGH (ref 3.8–10.5)

## 2024-07-13 PROCEDURE — 99232 SBSQ HOSP IP/OBS MODERATE 35: CPT | Mod: GC

## 2024-07-13 RX ORDER — POTASSIUM CHLORIDE 600 MG/1
40 TABLET, FILM COATED, EXTENDED RELEASE ORAL ONCE
Refills: 0 | Status: COMPLETED | OUTPATIENT
Start: 2024-07-13 | End: 2024-07-13

## 2024-07-13 RX ORDER — VALSARTAN 320 MG/1
1 TABLET ORAL
Refills: 0 | DISCHARGE

## 2024-07-13 RX ORDER — LINACLOTIDE 290 UG/1
1 CAPSULE, GELATIN COATED ORAL
Refills: 0 | DISCHARGE

## 2024-07-13 RX ORDER — VARENICLINE 0.5 MG/1
1 TABLET, FILM COATED ORAL
Refills: 0 | DISCHARGE

## 2024-07-13 RX ORDER — TAMSULOSIN HYDROCHLORIDE 0.4 MG/1
1 CAPSULE ORAL
Qty: 30 | Refills: 0
Start: 2024-07-13 | End: 2024-08-11

## 2024-07-13 RX ORDER — TAMSULOSIN HYDROCHLORIDE 0.4 MG/1
0.4 CAPSULE ORAL AT BEDTIME
Refills: 0 | Status: DISCONTINUED | OUTPATIENT
Start: 2024-07-13 | End: 2024-07-15

## 2024-07-13 RX ORDER — AMLODIPINE BESYLATE 2.5 MG/1
1 TABLET ORAL
Refills: 0 | DISCHARGE

## 2024-07-13 RX ORDER — TAMSULOSIN HYDROCHLORIDE 0.4 MG/1
1 CAPSULE ORAL
Qty: 0 | Refills: 0 | DISCHARGE
Start: 2024-07-13

## 2024-07-13 RX ADMIN — TAMSULOSIN HYDROCHLORIDE 0.4 MILLIGRAM(S): 0.4 CAPSULE ORAL at 22:31

## 2024-07-13 RX ADMIN — Medication 100 MILLIGRAM(S): at 22:25

## 2024-07-13 RX ADMIN — Medication 63.75 MILLIMOLE(S): at 09:48

## 2024-07-13 RX ADMIN — HEPARIN SODIUM 5000 UNIT(S): 50 INJECTION, SOLUTION INTRAVENOUS at 05:26

## 2024-07-13 RX ADMIN — POTASSIUM CHLORIDE 40 MILLIEQUIVALENT(S): 600 TABLET, FILM COATED, EXTENDED RELEASE ORAL at 09:48

## 2024-07-13 RX ADMIN — HEPARIN SODIUM 5000 UNIT(S): 50 INJECTION, SOLUTION INTRAVENOUS at 22:24

## 2024-07-13 RX ADMIN — HEPARIN SODIUM 5000 UNIT(S): 50 INJECTION, SOLUTION INTRAVENOUS at 13:44

## 2024-07-13 NOTE — PROGRESS NOTE ADULT - PROBLEM SELECTOR PLAN 1
Likely 2/2 to BPH  - UA showing LE, Nitrites   - no urine cell counts resulted   - S/p 1g CTX in ED   - F/u UCx   - ceftriaxone 1g for 5-7 days Likely 2/2 to BPH  - UA showing LE, Nitrites   - no urine cell counts resulted   - S/p 1g CTX in ED   - F/u UCx   - ceftriaxone 1g for 7 days Likely 2/2 to BPH  - UA showing LE, Nitrites   - no urine cell counts resulted   - S/p 1g CTX in ED   - F/u UCx   - ceftriaxone 1g for 7 days (7/12-

## 2024-07-13 NOTE — DIETITIAN INITIAL EVALUATION ADULT - PERTINENT MEDS FT
MEDICATIONS  (STANDING):  cefTRIAXone   IVPB 1000 milliGRAM(s) IV Intermittent every 24 hours  heparin   Injectable 5000 Unit(s) SubCutaneous every 8 hours  tamsulosin 0.4 milliGRAM(s) Oral at bedtime    MEDICATIONS  (PRN):

## 2024-07-13 NOTE — PROGRESS NOTE ADULT - PROBLEM SELECTOR PLAN 3
Likely pre-renal 2/2 to dehydration form decreased PO intake and watery diarrhea   - Cr. 2.6, baseline ~1.1   - Metabolic Acidosis w/ Bicarb 17   - Hyperkalemic to 5.6    - s/p 2L LR in ED   - Encourage PO intake  - Start DASH diet Likely pre-renal 2/2 to dehydration form decreased PO intake and watery diarrhea   - Improving  - Cr. 2.6 at ED, baseline ~1.1   - Metabolic Acidosis w/ Bicarb 17 at ED  - s/p 2L LR in ED   - NS 80 cc/hr  - Encourage PO intake  - Start DASH diet Likely pre-renal 2/2 to dehydration form decreased PO intake and watery diarrhea   - Improving  - Cr. 2.6 at ED, baseline ~1.1   - Metabolic Acidosis w/ Bicarb 17 at ED  - s/p 2L LR in ED   - Stop NS 80 cc/hr  - Encourage PO intake  - Start DASH diet

## 2024-07-13 NOTE — PROGRESS NOTE ADULT - PROBLEM SELECTOR PLAN 4
Multiple episodes of watery, nonbloody diarrhea for 2 days    - Promote PO hydration  - I&O  - Stool Count  - Stool PCR  - Stool Culture  - Stool C. diff Multiple episodes of watery, nonbloody diarrhea for 2 days, likely viral  - Patient has no history of recent abx use, less likely c. diff    - Promote PO hydration  - I&O  - Stool Count  - Stool PCR  - Stool Culture  - Stool C. diff

## 2024-07-13 NOTE — DIETITIAN INITIAL EVALUATION ADULT - PERTINENT LABORATORY DATA
07-13    133<L>  |  100  |  34<H>  ----------------------------<  156<H>  3.4<L>   |  18<L>  |  1.44<H>    Ca    8.5      13 Jul 2024 06:06  Phos  2.1     07-13  Mg     2.10     07-13    TPro  6.8  /  Alb  3.2<L>  /  TBili  0.8  /  DBili  x   /  AST  27  /  ALT  28  /  AlkPhos  120  07-13

## 2024-07-13 NOTE — DIETITIAN INITIAL EVALUATION ADULT - NUTRITIONGOAL OUTCOME1
Adequate oral intake (>75% at most meals, 100% oral nutrition supplement)   Maintain weight (+-2%)  Minimize signs and symptoms of muscle/fat loss

## 2024-07-13 NOTE — PROGRESS NOTE ADULT - PROBLEM SELECTOR PLAN 5
Likely 2/2 dehydration from decreased PO intake  - NS 80 cc/hr  - Encourage PO intake  - monitor I&O Likely 2/2 dehydration from decreased PO intake  - Stop NS 80 cc/hr  - Encourage PO intake  - monitor I&O

## 2024-07-13 NOTE — DISCHARGE NOTE PROVIDER - NSDCMRMEDTOKEN_GEN_ALL_CORE_FT
acetaminophen 325 mg oral tablet: 2 tab(s) orally every 4 hours, As Needed for pain  amLODIPine 2.5 mg oral tablet: 1 tab(s) orally once a day  Cipro 500 mg oral tablet: 1 tab(s) orally every 12 hours  Cipro 500 mg oral tablet: 1 tab(s) orally 2 times a day   ciprofloxacin 500 mg oral tablet: 1 tab(s) orally every 12 hours   cyclobenzaprine 10 mg oral tablet: 1 tab(s) orally 3 times a day   Flagyl 500 mg oral tablet: 1 tab(s) orally 3 times a day   hydrochlorothiazide 12.5 mg oral tablet: 1 tab(s) orally once a day  ibuprofen 600 mg oral tablet: 1 tab(s) orally every 6 hours   lisinopril: 1 tab(s) orally once a day  metroNIDAZOLE 500 mg oral tablet: 1 tab(s) orally 3 times a day   MiraLax oral powder for reconstitution: 17 gram(s) orally once a day for constipation  Naprosyn 500 mg oral tablet: 1 tab(s) orally 2 times a day   amLODIPine 10 mg oral tablet: 1 tab(s) orally once a day  cholecalciferol: 5,000 international unit(s) once a week  Flomax 0.4 mg oral capsule: 1 cap(s) orally once a day  Linzess 290 mcg oral capsule: 1 cap(s) orally once a day  tamsulosin 0.4 mg oral capsule: 1 cap(s) orally once a day (at bedtime)  valsartan 160 mg oral tablet: 1 tab(s) orally once a day  varenicline 1 mg oral tablet: 1 tab(s) orally 2 times a day   amLODIPine 10 mg oral tablet: 1 tab(s) orally once a day  cholecalciferol: 5,000 international unit(s) once a week  Linzess 290 mcg oral capsule: 1 cap(s) orally once a day  valsartan 160 mg oral tablet: 1 tab(s) orally once a day  varenicline 1 mg oral tablet: 1 tab(s) orally 2 times a day   amLODIPine 10 mg oral tablet: 1 tab(s) orally once a day  cholecalciferol: 5,000 international unit(s) once a week  ciprofloxacin 500 mg oral tablet: 1 tab(s) orally 2 times a day  Linzess 290 mcg oral capsule: 1 cap(s) orally once a day  tamsulosin 0.4 mg oral capsule: 1 cap(s) orally once a day (at bedtime)  valsartan 160 mg oral tablet: 1 tab(s) orally once a day  varenicline 1 mg oral tablet: 1 tab(s) orally 2 times a day

## 2024-07-13 NOTE — DISCHARGE NOTE PROVIDER - NSDCCPCAREPLAN_GEN_ALL_CORE_FT
PRINCIPAL DISCHARGE DIAGNOSIS  Diagnosis: Sepsis  Assessment and Plan of Treatment: You came in very weak, in pain, with a fever. In the ED your heart was racing and you blood pressure was very low. Your blood was also very high in white blood cells, a marker of infection in your body. All of these reasons confirm that you had sepsis, when an infection spreads throughout your blood to your entire body. This was likely caused by your UTI. For this we started you on antibiotics and gave you IV fluids to help keep you hydrated and rid the infecion.   If you ever feel weak, with fevers/chills, with the feelings of burning with urination or having increased urinary urgency, or retention and in pain please come back to the hospital      SECONDARY DISCHARGE DIAGNOSES  Diagnosis: Urinary tract infection  Assessment and Plan of Treatment: You had feelings of urinary urgency. We tested your urine and found that you had a UTI. You are at risk for UTIs because of your BPH which is why we recommend you to take flomax to follow up with your primary care doctor about this. We gave you antibiotics to help kill the infection.    Diagnosis: BRANDON (acute kidney injury)  Assessment and Plan of Treatment: You had some right abdominal pain and we found that your Creatinine was high for you. This was a sign of acute kidney injury. This happened because you werent eating or drinking much because you felt sick, but you also were having diarrhea. This caused dehydration which stressed out your kidneys. For this we gave you IV fluids to help the kidneys.     PRINCIPAL DISCHARGE DIAGNOSIS  Diagnosis: Sepsis  Assessment and Plan of Treatment: You came in very weak, in pain, with a fever. In the ED your heart was racing and you blood pressure was very low. Your blood was also very high in white blood cells, a marker of infection in your body. All of these reasons confirm that you had sepsis, when an infection spreads throughout your blood to your entire body. This was likely caused by your UTI. For this we started you on antibiotics and gave you IV fluids to help keep you hydrated and rid the infecion. Please continue taking the following antibiotic: ciprofloxacin 500 mg, twice a day for 4 days to complete a full 7 day course.   If you ever feel weak, with fevers/chills, with the feelings of burning with urination or having increased urinary urgency, or retention and in pain please come back to the hospital      SECONDARY DISCHARGE DIAGNOSES  Diagnosis: Urinary tract infection  Assessment and Plan of Treatment: You had feelings of urinary urgency. We tested your urine and found that you had a UTI. You are at risk for UTIs because of your BPH which is why we recommend you to take the following medication: tamsulosin 0.4 mg at bedtime daily until you follow up with your primary care doctor about this. Additionally, as mentioned, please continue with the ciprofloxacin 500 mg, twice a day for 4 days.    Diagnosis: BRANDON (acute kidney injury)  Assessment and Plan of Treatment: You had some right abdominal pain and we found that your Creatinine was high for you. This was a sign of acute kidney injury. This happened because you werent eating or drinking much because you felt sick, but you also were having diarrhea. This caused dehydration which stressed out your kidneys. For this we gave you IV fluids to help the kidneys.

## 2024-07-13 NOTE — DISCHARGE NOTE PROVIDER - CARE PROVIDER_API CALL
GISSELLE PATEL  [address] [place] [address]  Phone: [phone]  Fax: [phone]  Established Patient  Follow Up Time: 1 week

## 2024-07-13 NOTE — DISCHARGE NOTE PROVIDER - NSDCCPTREATMENT_GEN_ALL_CORE_FT
PRINCIPAL PROCEDURE  Procedure: CT abdomen & pelvis  Findings and Treatment: No acute findings.      SECONDARY PROCEDURE  Procedure: XR chest AP  Findings and Treatment: Clear Lungs

## 2024-07-13 NOTE — DISCHARGE NOTE PROVIDER - HOSPITAL COURSE
HPI:  64M with hypertension, BPH, Hx of lower GI bleed presented to [facility] ED for R-sided abdominal pain and diarrhea. Patients notes the pain started on Monday. The patient has intermittent 7/10 R-sided abdominal pain with no radiation. The pain does not change with activity or with eating. Patient also endorses increased urinary frequency, noting that over the past several nights he would have to awake every 30 minutes to urinate. He notes significant urgency that at times he would be able to make it due to the bathroom. The patient also feels that he is retaining urine and feels he cannot empty his entire bladder.  The patient has had 2 days of watery stools w/o melena or gabino blood that was worse in the beginning of the weak, but has improved. He also endorses general fatigue, decreased oral intake over the week and having an oral Temperature of 101 2 days ago. Patient notes around February/March he was diagnosed with BPH and had a biopsy. He denies nausea, vomiting, burning with urination, chest pain, no recent travel, and no sick contacts.      (12 Jul 2024 16:36)    Hospital Course:      Important Medication Changes and Reason:    Active or Pending Issues Requiring Follow-up:    Advanced Directives:   [ ] Full code  [ ] DNR  [ ] Hospice    Discharge Diagnoses:         HPI:  64M with hypertension, BPH, Hx of lower GI bleed presented to [facility] ED for R-sided abdominal pain and diarrhea. Patients notes the pain started on Monday. The patient has intermittent 7/10 R-sided abdominal pain with no radiation. The pain does not change with activity or with eating. Patient also endorses increased urinary frequency, noting that over the past several nights he would have to awake every 30 minutes to urinate. He notes significant urgency that at times he would be able to make it due to the bathroom. The patient also feels that he is retaining urine and feels he cannot empty his entire bladder.  The patient has had 2 days of watery stools w/o melena or gabino blood that was worse in the beginning of the weak, but has improved. He also endorses general fatigue, decreased oral intake over the week and having an oral Temperature of 101 2 days ago. Patient notes around February/March he was diagnosed with BPH and had a biopsy. He denies nausea, vomiting, burning with urination, chest pain, no recent travel, and no sick contacts.      (12 Jul 2024 16:36)    Hospital Course:  Patient admitted with weakness, fever, abdominal pain, urinary symptoms, and watery diarrhea. In ED patient was septic with leukocytosis, tachycardia, and hypotensive. Labs significant for BRANDON and UTI. Blood pressure meds were held and patient started on ceftriaxone and was given 2L IVF in ED. Overnight patient was given IVF drip which was discontinued in the morning because the patient became asymptomatic with improvement in vitals, symptoms, and Cr. Stool studies and c. diff were _______. Patient had a mild transaminitis in setting of infection, but acute viral hepatitis panel was ordered which was ______. Patient started on tamsulosin for BPH which likely precipitated UTI. Patient was discharged after urine cultures came back for _______ and patient was discharged on ______ abx.    Important Medication Changes and Reason:  Flomax  ABX_____    Active or Pending Issues Requiring Follow-up:  BPH    Advanced Directives:   [X] Full code  [ ] DNR  [ ] Hospice    Discharge Diagnoses:  Urosepsis  UTI  BRANDON  BPH       HPI:  64M with hypertension, BPH, Hx of lower GI bleed presented to [facility] ED for R-sided abdominal pain and diarrhea. Patients notes the pain started on Monday. The patient has intermittent 7/10 R-sided abdominal pain with no radiation. The pain does not change with activity or with eating. Patient also endorses increased urinary frequency, noting that over the past several nights he would have to awake every 30 minutes to urinate. He notes significant urgency that at times he would be able to make it due to the bathroom. The patient also feels that he is retaining urine and feels he cannot empty his entire bladder.  The patient has had 2 days of watery stools w/o melena or gabino blood that was worse in the beginning of the weak, but has improved. He also endorses general fatigue, decreased oral intake over the week and having an oral Temperature of 101 2 days ago. Patient notes around February/March he was diagnosed with BPH and had a biopsy. He denies nausea, vomiting, burning with urination, chest pain, no recent travel, and no sick contacts.      (12 Jul 2024 16:36)    Hospital Course:  Patient admitted with weakness, fever, abdominal pain, urinary symptoms, and watery diarrhea. In ED patient was septic with leukocytosis, tachycardia, and hypotensive. Labs significant for BRANDON and UTI. Blood pressure meds were held and patient started on ceftriaxone and was given 2L IVF in ED. Overnight patient was given IVF drip which was discontinued in the morning because the patient became asymptomatic with improvement in vitals, symptoms, and Cr. Stool studies were positive for adenovirus. Patient had a mild transaminitis in setting of infection, but resolved with uti and diarrhea improving. Patient started on tamsulosin for BPH which likely precipitated UTI. Patient was discharged after urine cultures came back for E. coli and patient was discharged on 3 days of PO antibiotics to complete a 7 day course. On day of discharge, pt hemodynamically stable and ready for discharge home w/ outpt follow up.     Important Medication Changes and Reason:  Flomax      Active or Pending Issues Requiring Follow-up:  BPH    Advanced Directives:   [X] Full code  [ ] DNR  [ ] Hospice    Discharge Diagnoses:  Urosepsis  UTI  BRANDON  BPH       HPI:  64M with hypertension, BPH, Hx of lower GI bleed presented to [facility] ED for R-sided abdominal pain and diarrhea. Patients notes the pain started on Monday. The patient has intermittent 7/10 R-sided abdominal pain with no radiation. The pain does not change with activity or with eating. Patient also endorses increased urinary frequency, noting that over the past several nights he would have to awake every 30 minutes to urinate. He notes significant urgency that at times he would be able to make it due to the bathroom. The patient also feels that he is retaining urine and feels he cannot empty his entire bladder.  The patient has had 2 days of watery stools w/o melena or gabino blood that was worse in the beginning of the weak, but has improved. He also endorses general fatigue, decreased oral intake over the week and having an oral Temperature of 101 2 days ago. Patient notes around February/March he was diagnosed with BPH and had a biopsy. He denies nausea, vomiting, burning with urination, chest pain, no recent travel, and no sick contacts.      (12 Jul 2024 16:36)    Hospital Course:  Patient admitted with weakness, fever, abdominal pain, urinary symptoms, and watery diarrhea. In ED patient was septic with leukocytosis, tachycardia, and hypotensive. Labs significant for BRANDON and UTI. Blood pressure meds were held and patient started on ceftriaxone and was given 2L IVF in ED. Overnight patient was given IVF drip which was discontinued in the morning because the patient became asymptomatic with improvement in vitals, symptoms, and Cr. Stool studies were positive for adenovirus. Patient had a mild transaminitis in setting of infection, but resolved with uti and diarrhea improving. Patient started on tamsulosin for BPH which likely precipitated UTI. Patient was discharged after urine cultures came back for E. coli and patient was discharged on 4 days of PO antibiotics to complete a 7 day course. On day of discharge, pt hemodynamically stable and ready for discharge home w/ outpt follow up.     Important Medication Changes and Reason:  Flomax 0.4 mg QHS   Ciprofloxacin 500 mg BID       Active or Pending Issues Requiring Follow-up:  BPH    Advanced Directives:   [X] Full code  [ ] DNR  [ ] Hospice    Discharge Diagnoses:  Urosepsis  UTI  BRANDON  BPH

## 2024-07-13 NOTE — PROGRESS NOTE ADULT - PROBLEM SELECTOR PLAN 6
Likely 2/2 to medication vs infection vs acute viral hepatitis  - Acute viral hepatitis panel ordered

## 2024-07-13 NOTE — PROGRESS NOTE ADULT - SUBJECTIVE AND OBJECTIVE BOX
INTERVAL HPI/OVERNIGHT EVENTS:  Pt seen and examined at bedside. No acute overnight events or complaints.    VITAL SIGNS:  T(F): 98 (24 @ 06:09)  HR: 87 (24 @ 06:09)  BP: 126/73 (24 @ 06:09)  RR: 16 (24 @ 06:09)  SpO2: 100% (24 @ 06:09)  Wt(kg): --    PHYSICAL EXAM:    Constitutional: WDWN, NAD  HEENT: PERRL, EOMI, sclera non-icteric, neck supple, trachea midline, no masses, no JVD, MMM, good dentition  Respiratory: CTA b/l, good air entry b/l, no wheezing, no rhonchi, no rales, without accessory muscle use and no intercostal retractions  Cardiovascular: RRR, normal S1S2, no M/R/G  Gastrointestinal: soft, NTND, no masses palpable, BS normal  Extremities: Warm, well perfused, pulses equal bilateral upper and lower extremities, no edema, no clubbing. Capillary refill <2 sec  Neurological: AAOx3, CN Grossly intact  Skin: Normal temperature, warm, dry    MEDICATIONS  (STANDING):  cefTRIAXone   IVPB 1000 milliGRAM(s) IV Intermittent every 24 hours  heparin   Injectable 5000 Unit(s) SubCutaneous every 8 hours  sodium chloride 0.9%. 1000 milliLiter(s) (80 mL/Hr) IV Continuous <Continuous>    MEDICATIONS  (PRN):      Allergies    No Known Allergies    Intolerances        LABS:                        14.2   20.47 )-----------( 186      ( 2024 06:06 )             40.9     07-12    131<L>  |  96<L>  |  44<H>  ----------------------------<  88  5.0   |  18<L>  |  2.32<H>    Ca    8.6      2024 15:28    TPro  7.1  /  Alb  3.2<L>  /  TBili  1.8<H>  /  DBili  x   /  AST  58<H>  /  ALT  38  /  AlkPhos  124<H>  07-12    PT/INR - ( 2024 12:40 )   PT: 12.6 sec;   INR: 1.13 ratio         PTT - ( 2024 12:40 )  PTT:33.3 sec  Urinalysis Basic - ( 2024 15:28 )    Color: Dark Yellow / Appearance: Turbid / S.035 / pH: x  Gluc: 88 mg/dL / Ketone: 15 mg/dL  / Bili: Moderate / Urobili: 1.0 mg/dL   Blood: x / Protein: 100 mg/dL / Nitrite: Positive   Leuk Esterase: Moderate / RBC: 4 /HPF /  /HPF   Sq Epi: x / Non Sq Epi: 8 /HPF / Bacteria: Few /HPF        RADIOLOGY & ADDITIONAL TESTS:  Reviewed      ******************  Authored By: Truong Hutson MD PGY1  Internal Medicine  MS Teams Preferred  ******************   INTERVAL HPI/OVERNIGHT EVENTS:  Pt seen and examined at bedside. No acute overnight events or complaints. Patient denies pain, denies urinary urgency, burning or retention. Patient did not have a watery BM overnight. He denies fevers/chills, nausea/vomiting.    VITAL SIGNS:  T(F): 98 (24 @ 06:09)  HR: 87 (24 @ 06:09)  BP: 126/73 (24 @ 06:09)  RR: 16 (24 @ 06:09)  SpO2: 100% (24 @ 06:09)  Wt(kg): --    PHYSICAL EXAM:    CONSTITUTIONAL: NAD, well-developed, well-groomed  EYES:; conjunctiva and sclera clear  ENMT: Moist oral mucosa, no pharyngeal injection or exudates; normal dentition  NECK: Supple, no palpable masses; no thyromegaly  RESPIRATORY: Normal respiratory effort; lungs are clear to auscultation bilaterally  CARDIOVASCULAR: Regular rate and rhythm, normal S1 and S2, no murmur/rub/gallop; No lower extremity edema  ABDOMEN: nontender palpation, normoactive bowel sounds, no rebound/guarding; No hepatosplenomegaly  MUSCULOSKELETAL:  Normal gait; no clubbing or cyanosis of digits; no joint swelling or tenderness to palpation  PSYCH: A+O x 3 to person, place, and time; affect appropriate  NEUROLOGY: no gross sensory deficits   SKIN: No rashes; no palpable lesions    MEDICATIONS  (STANDING):  cefTRIAXone   IVPB 1000 milliGRAM(s) IV Intermittent every 24 hours  heparin   Injectable 5000 Unit(s) SubCutaneous every 8 hours  sodium chloride 0.9%. 1000 milliLiter(s) (80 mL/Hr) IV Continuous <Continuous>    MEDICATIONS  (PRN):      Allergies    No Known Allergies    Intolerances        LABS:                        14.2   20.47 )-----------( 186      ( 2024 06:06 )             40.9     07-12    131<L>  |  96<L>  |  44<H>  ----------------------------<  88  5.0   |  18<L>  |  2.32<H>    Ca    8.6      2024 15:28    TPro  7.1  /  Alb  3.2<L>  /  TBili  1.8<H>  /  DBili  x   /  AST  58<H>  /  ALT  38  /  AlkPhos  124<H>  07-12    PT/INR - ( 2024 12:40 )   PT: 12.6 sec;   INR: 1.13 ratio         PTT - ( 2024 12:40 )  PTT:33.3 sec  Urinalysis Basic - ( 2024 15:28 )    Color: Dark Yellow / Appearance: Turbid / S.035 / pH: x  Gluc: 88 mg/dL / Ketone: 15 mg/dL  / Bili: Moderate / Urobili: 1.0 mg/dL   Blood: x / Protein: 100 mg/dL / Nitrite: Positive   Leuk Esterase: Moderate / RBC: 4 /HPF /  /HPF   Sq Epi: x / Non Sq Epi: 8 /HPF / Bacteria: Few /HPF        RADIOLOGY & ADDITIONAL TESTS:  Reviewed      ******************  Authored By: Truong Hutson MD PGY1  Internal Medicine  MS Teams Preferred  ******************

## 2024-07-13 NOTE — PROGRESS NOTE ADULT - PROBLEM SELECTOR PLAN 2
Likely 2/2 to UTI  - Leukocytosis to 27.5 and tachycardic to 100 but afebrile   - UA showing LE, Nitrite  - Lactate 2.7  - S/p 2L LR in ED  - S/p 1x dose CTX (7/12) in ED   - F/u UCx, BCx, GI PCR, C.diff  - Start NS 80 cc/hr  - Trend Lactate  - Abx as above Likely 2/2 to UTI  - Leukocytosis to 27.5 and tachycardic to 100 but afebrile   - UA showing LE, Nitrite  - Lactate 2.7  - S/p 2L LR in ED  - S/p 1x dose CTX (7/12) in ED   - F/u UCx, BCx, GI PCR, C.diff  - C/w NS 80 cc/hr  - Trend Lactate  - Abx as above Likely 2/2 to UTI  - Leukocytosis to 27.5 and tachycardic to 100 but afebrile   - UA showing LE, Nitrite  - Lactate 2.7  - S/p 2L LR in ED  - S/p 1x dose CTX (7/12) in ED   - F/u UCx, GI PCR, C.diff  - Stop NS 80 cc/hr  - Trend Lactate  - Abx as above

## 2024-07-13 NOTE — DIETITIAN INITIAL EVALUATION ADULT - PERSON TAUGHT/METHOD
Education provided on adequate oral intake at meal times. Encouraged supplement intake to meet nutritional needs/maintain weight. Explored patient food preferences to implement as able during hospitalization. Reviewed menu offering and showed patient how to place meal orders. Patient verbalized understanding and denied further questions./verbal instruction/individual instruction/patient instructed

## 2024-07-13 NOTE — DIETITIAN INITIAL EVALUATION ADULT - OTHER INFO
64M PMH HTN, BPH admitted for workup of UTI, diarrhea, and leukocytosis.     Patient met bedside in good spirits. Patient endorsed he is feeling better, and eating well. Reported his appetite was poor 2-3 days PTA due to abdominal pain and diarrhea. Patient mostly relied on take out foods. Patient drank Nutrament supplement ~3x/week PTA. Patient did not weight himself however, stated his wife believes he lost weight in the days PTA thus prompting his visit to hospital. Verbalized diarrhea is now resolved and denied nausea/vomitting/constipation. Denied food allergies. Reported dietary restriction of no pork and dislike of chocolate.     Per HIE: 64.9 kg 07/13/24, 68.5 kg 02/28/24; patient appears with -5.3% weight loss x ~5 months. Patient reported UBW of 145-150 pounds.

## 2024-07-13 NOTE — PROGRESS NOTE ADULT - ASSESSMENT
28 year old man with hypertension, BPH, Hx of lower GI bleed who presented to [facility] ED for R-sided abdominal pain and diarrhea. Patient presented with tachycardia, elevated WBC, UA significant for UTI with normal CT abdomen. Patient admitted for UTI likely 2/2 with BPH and BRANDON likely 2/2 watery diarrhea and decreased PO intake. 28 year old man with hypertension, BPH, Hx of lower GI bleed who presented to [facility] ED for R-sided abdominal pain and diarrhea. Patient presented with tachycardia, elevated WBC, UA significant for UTI with normal CT abdomen. Patient admitted for UTI likely 2/2 with BPH and BRANDON likely 2/2 watery diarrhea and decreased PO intake.

## 2024-07-14 LAB
ALBUMIN SERPL ELPH-MCNC: 3 G/DL — LOW (ref 3.3–5)
ALP SERPL-CCNC: 105 U/L — SIGNIFICANT CHANGE UP (ref 40–120)
ALT FLD-CCNC: 28 U/L — SIGNIFICANT CHANGE UP (ref 4–41)
ANION GAP SERPL CALC-SCNC: 14 MMOL/L — SIGNIFICANT CHANGE UP (ref 7–14)
AST SERPL-CCNC: 28 U/L — SIGNIFICANT CHANGE UP (ref 4–40)
BASOPHILS # BLD AUTO: 0.04 K/UL — SIGNIFICANT CHANGE UP (ref 0–0.2)
BASOPHILS NFR BLD AUTO: 0.3 % — SIGNIFICANT CHANGE UP (ref 0–2)
BILIRUB SERPL-MCNC: 0.7 MG/DL — SIGNIFICANT CHANGE UP (ref 0.2–1.2)
BUN SERPL-MCNC: 15 MG/DL — SIGNIFICANT CHANGE UP (ref 7–23)
CALCIUM SERPL-MCNC: 8.6 MG/DL — SIGNIFICANT CHANGE UP (ref 8.4–10.5)
CHLORIDE SERPL-SCNC: 102 MMOL/L — SIGNIFICANT CHANGE UP (ref 98–107)
CO2 SERPL-SCNC: 21 MMOL/L — LOW (ref 22–31)
CREAT SERPL-MCNC: 1 MG/DL — SIGNIFICANT CHANGE UP (ref 0.5–1.3)
EGFR: 85 ML/MIN/1.73M2 — SIGNIFICANT CHANGE UP
EOSINOPHIL # BLD AUTO: 0.08 K/UL — SIGNIFICANT CHANGE UP (ref 0–0.5)
EOSINOPHIL NFR BLD AUTO: 0.7 % — SIGNIFICANT CHANGE UP (ref 0–6)
GLUCOSE SERPL-MCNC: 101 MG/DL — HIGH (ref 70–99)
HBV SURFACE AG SER-ACNC: SIGNIFICANT CHANGE UP
HCT VFR BLD CALC: 40.2 % — SIGNIFICANT CHANGE UP (ref 39–50)
HCV AB S/CO SERPL IA: 0.06 S/CO — SIGNIFICANT CHANGE UP (ref 0–0.99)
HCV AB SERPL-IMP: SIGNIFICANT CHANGE UP
HGB BLD-MCNC: 14.2 G/DL — SIGNIFICANT CHANGE UP (ref 13–17)
IANC: 8.04 K/UL — HIGH (ref 1.8–7.4)
IMM GRANULOCYTES NFR BLD AUTO: 0.7 % — SIGNIFICANT CHANGE UP (ref 0–0.9)
LYMPHOCYTES # BLD AUTO: 18.8 % — SIGNIFICANT CHANGE UP (ref 13–44)
LYMPHOCYTES # BLD AUTO: 2.16 K/UL — SIGNIFICANT CHANGE UP (ref 1–3.3)
MAGNESIUM SERPL-MCNC: 2 MG/DL — SIGNIFICANT CHANGE UP (ref 1.6–2.6)
MCHC RBC-ENTMCNC: 29.8 PG — SIGNIFICANT CHANGE UP (ref 27–34)
MCHC RBC-ENTMCNC: 35.3 GM/DL — SIGNIFICANT CHANGE UP (ref 32–36)
MCV RBC AUTO: 84.3 FL — SIGNIFICANT CHANGE UP (ref 80–100)
MONOCYTES # BLD AUTO: 1.09 K/UL — HIGH (ref 0–0.9)
MONOCYTES NFR BLD AUTO: 9.5 % — SIGNIFICANT CHANGE UP (ref 2–14)
NEUTROPHILS # BLD AUTO: 8.04 K/UL — HIGH (ref 1.8–7.4)
NEUTROPHILS NFR BLD AUTO: 70 % — SIGNIFICANT CHANGE UP (ref 43–77)
NRBC # BLD: 0 /100 WBCS — SIGNIFICANT CHANGE UP (ref 0–0)
NRBC # FLD: 0 K/UL — SIGNIFICANT CHANGE UP (ref 0–0)
PHOSPHATE SERPL-MCNC: 2.7 MG/DL — SIGNIFICANT CHANGE UP (ref 2.5–4.5)
PLATELET # BLD AUTO: 179 K/UL — SIGNIFICANT CHANGE UP (ref 150–400)
POTASSIUM SERPL-MCNC: 3.5 MMOL/L — SIGNIFICANT CHANGE UP (ref 3.5–5.3)
POTASSIUM SERPL-SCNC: 3.5 MMOL/L — SIGNIFICANT CHANGE UP (ref 3.5–5.3)
PROT SERPL-MCNC: 6.8 G/DL — SIGNIFICANT CHANGE UP (ref 6–8.3)
RBC # BLD: 4.77 M/UL — SIGNIFICANT CHANGE UP (ref 4.2–5.8)
RBC # FLD: 13.5 % — SIGNIFICANT CHANGE UP (ref 10.3–14.5)
SODIUM SERPL-SCNC: 137 MMOL/L — SIGNIFICANT CHANGE UP (ref 135–145)
WBC # BLD: 11.49 K/UL — HIGH (ref 3.8–10.5)
WBC # FLD AUTO: 11.49 K/UL — HIGH (ref 3.8–10.5)

## 2024-07-14 PROCEDURE — 99232 SBSQ HOSP IP/OBS MODERATE 35: CPT | Mod: GC

## 2024-07-14 RX ADMIN — HEPARIN SODIUM 5000 UNIT(S): 50 INJECTION, SOLUTION INTRAVENOUS at 05:43

## 2024-07-14 RX ADMIN — Medication 100 MILLIGRAM(S): at 23:14

## 2024-07-14 RX ADMIN — TAMSULOSIN HYDROCHLORIDE 0.4 MILLIGRAM(S): 0.4 CAPSULE ORAL at 21:56

## 2024-07-14 RX ADMIN — HEPARIN SODIUM 5000 UNIT(S): 50 INJECTION, SOLUTION INTRAVENOUS at 15:03

## 2024-07-14 NOTE — PROGRESS NOTE ADULT - PROBLEM SELECTOR PLAN 3
Likely pre-renal 2/2 to dehydration form decreased PO intake and watery diarrhea   - Improving  - Cr. 2.6 at ED, baseline ~1.1   - Metabolic Acidosis w/ Bicarb 17 at ED  - s/p 2L LR in ED   - Stop NS 80 cc/hr  - Encourage PO intake  - Start DASH diet Likely pre-renal 2/2 to dehydration form decreased PO intake and watery diarrhea   - Improving  - Cr. 2.6 at ED, baseline ~1.1   - Metabolic Acidosis w/ Bicarb 17 at ED  - s/p 2L LR in ED   - Encourage PO intake  - C/w DASH diet

## 2024-07-14 NOTE — PROGRESS NOTE ADULT - PROBLEM SELECTOR PLAN 6
Likely 2/2 to medication vs infection vs acute viral hepatitis  - Acute viral hepatitis panel ordered Likely 2/2 to medication vs infection vs acute viral hepatitis  > Acute viral hepatitis panel negative

## 2024-07-14 NOTE — PROGRESS NOTE ADULT - SUBJECTIVE AND OBJECTIVE BOX
***************************************************************  Samanta Garduno, PGY 1   Internal Medicine   ***************************************************************    MALORIE VELASCO  64y  MRN: [id number]    Patient is a 64y old  Male who presents with a chief complaint of Leukocytosis     (13 Jul 2024 13:53)      Subjective: no events ON. Denies fever, CP, SOB, abn pain, N/V, dysuria. Tolerating diet.      MEDICATIONS  (STANDING):  cefTRIAXone   IVPB 1000 milliGRAM(s) IV Intermittent every 24 hours  heparin   Injectable 5000 Unit(s) SubCutaneous every 8 hours  tamsulosin 0.4 milliGRAM(s) Oral at bedtime    MEDICATIONS  (PRN):      Objective:    Vitals: Vital Signs Last 24 Hrs  T(C): 36.8 (07-14-24 @ 05:22), Max: 36.8 (07-13-24 @ 20:40)  T(F): 98.3 (07-14-24 @ 05:22), Max: 98.3 (07-13-24 @ 20:40)  HR: 76 (07-14-24 @ 05:22) (76 - 83)  BP: 117/71 (07-14-24 @ 05:22) (117/71 - 147/86)  BP(mean): --  RR: 17 (07-14-24 @ 05:22) (17 - 18)  SpO2: 97% (07-14-24 @ 05:22) (97% - 100%)            I&O's Summary    12 Jul 2024 07:01  -  13 Jul 2024 07:00  --------------------------------------------------------  IN: 800 mL / OUT: 450 mL / NET: 350 mL    13 Jul 2024 07:01  -  14 Jul 2024 06:58  --------------------------------------------------------  IN: 0 mL / OUT: 400 mL / NET: -400 mL        PHYSICAL EXAM:  GENERAL: NAD  HEAD:  Atraumatic, Normocephalic  EYES: EOMI, conjunctiva and sclera clear  CHEST/LUNG: Clear to auscultation bilaterally; No rales, rhonchi, wheezing, or rubs  HEART: Regular rate and rhythm; No murmurs, rubs, or gallops  ABDOMEN: Soft, Nontender, Nondistended;   SKIN: No rashes or lesions  NERVOUS SYSTEM:  Alert & Oriented X3, no focal deficits    LABS:  07-13    133<L>  |  100  |  34<H>  ----------------------------<  156<H>  3.4<L>   |  18<L>  |  1.44<H>  07-12    131<L>  |  96<L>  |  44<H>  ----------------------------<  88  5.0   |  18<L>  |  2.32<H>  07-12    132<L>  |  97<L>  |  44<H>  ----------------------------<  79  5.6<H>   |  17<L>  |  2.67<H>    Ca    8.5      13 Jul 2024 06:06  Ca    8.6      12 Jul 2024 15:28  Ca    9.1      12 Jul 2024 12:40  Phos  2.1     07-13  Mg     2.10     07-13    TPro  6.8  /  Alb  3.2<L>  /  TBili  0.8  /  DBili  x   /  AST  27  /  ALT  28  /  AlkPhos  120  07-13  TPro  7.1  /  Alb  3.2<L>  /  TBili  1.8<H>  /  DBili  x   /  AST  58<H>  /  ALT  38  /  AlkPhos  124<H>  07-12  TPro  8.3  /  Alb  3.6  /  TBili  2.5<H>  /  DBili  x   /  AST  81<H>  /  ALT  48<H>  /  AlkPhos  137<H>  07-12      PT/INR - ( 12 Jul 2024 12:40 )   PT: 12.6 sec;   INR: 1.13 ratio         PTT - ( 12 Jul 2024 12:40 )  PTT:33.3 sec              Urinalysis Basic - ( 13 Jul 2024 06:06 )    Color: x / Appearance: x / SG: x / pH: x  Gluc: 156 mg/dL / Ketone: x  / Bili: x / Urobili: x   Blood: x / Protein: x / Nitrite: x   Leuk Esterase: x / RBC: x / WBC x   Sq Epi: x / Non Sq Epi: x / Bacteria: x                              14.2   20.47 )-----------( 186      ( 13 Jul 2024 06:06 )             40.9                         16.2   27.50 )-----------( 180      ( 12 Jul 2024 12:40 )             44.3     CAPILLARY BLOOD GLUCOSE          RADIOLOGY & ADDITIONAL TESTS:    Imaging Personally Reviewed:  [x ] YES  [ ] NO    Consultants involved in case:   Consultant(s) Notes Reviewed:  [ x] YES  [ ] NO:   Care Discussed with Consultants/Other Providers [x ] YES  [ ] NO         ***************************************************************  Samanta Duncanjan, PGY 1   Internal Medicine   ***************************************************************    MALORIE ROD  64y  MRN: [id number]    Patient is a 64y old  Male who presents with a chief complaint of Leukocytosis     (13 Jul 2024 13:53)      Subjective: no events OVN. Seen at bedside, well appearing. Denies fever, CP, SOB, abn pain, N/V. Diarrhea has resolved. Endorsing suprapubic pressure improved since admission, otherwise no burning with urination, flank pain, urgency, hematuria. Tolerating diet.      MEDICATIONS  (STANDING):  cefTRIAXone   IVPB 1000 milliGRAM(s) IV Intermittent every 24 hours  heparin   Injectable 5000 Unit(s) SubCutaneous every 8 hours  tamsulosin 0.4 milliGRAM(s) Oral at bedtime    MEDICATIONS  (PRN):      Objective:    Vitals: Vital Signs Last 24 Hrs  T(C): 36.8 (07-14-24 @ 05:22), Max: 36.8 (07-13-24 @ 20:40)  T(F): 98.3 (07-14-24 @ 05:22), Max: 98.3 (07-13-24 @ 20:40)  HR: 76 (07-14-24 @ 05:22) (76 - 83)  BP: 117/71 (07-14-24 @ 05:22) (117/71 - 147/86)  BP(mean): --  RR: 17 (07-14-24 @ 05:22) (17 - 18)  SpO2: 97% (07-14-24 @ 05:22) (97% - 100%)            I&O's Summary    12 Jul 2024 07:01  -  13 Jul 2024 07:00  --------------------------------------------------------  IN: 800 mL / OUT: 450 mL / NET: 350 mL    13 Jul 2024 07:01  -  14 Jul 2024 06:58  --------------------------------------------------------  IN: 0 mL / OUT: 400 mL / NET: -400 mL        PHYSICAL EXAM:  CONSTITUTIONAL: NAD, well-developed, well-groomed  EYES:; conjunctiva and sclera clear  ENMT: Moist oral mucosa, no pharyngeal injection or exudates; normal dentition  NECK: Supple, no palpable masses; no thyromegaly  RESPIRATORY: Normal respiratory effort; lungs are clear to auscultation bilaterally  CARDIOVASCULAR: Regular rate and rhythm, normal S1 and S2, no murmur/rub/gallop; No lower extremity edema  ABDOMEN: mild tenderness to palpation lower abdomen, normoactive bowel sounds, no rebound/guarding; No hepatosplenomegaly  MUSCULOSKELETAL:  Normal gait; no clubbing or cyanosis of digits; no joint swelling or tenderness to palpation  PSYCH: A+O x 3 to person, place, and time; affect appropriate  NEUROLOGY: no gross sensory deficits   SKIN: No rashes; no palpable lesions    LABS:  07-13    133<L>  |  100  |  34<H>  ----------------------------<  156<H>  3.4<L>   |  18<L>  |  1.44<H>  07-12    131<L>  |  96<L>  |  44<H>  ----------------------------<  88  5.0   |  18<L>  |  2.32<H>  07-12    132<L>  |  97<L>  |  44<H>  ----------------------------<  79  5.6<H>   |  17<L>  |  2.67<H>    Ca    8.5      13 Jul 2024 06:06  Ca    8.6      12 Jul 2024 15:28  Ca    9.1      12 Jul 2024 12:40  Phos  2.1     07-13  Mg     2.10     07-13    TPro  6.8  /  Alb  3.2<L>  /  TBili  0.8  /  DBili  x   /  AST  27  /  ALT  28  /  AlkPhos  120  07-13  TPro  7.1  /  Alb  3.2<L>  /  TBili  1.8<H>  /  DBili  x   /  AST  58<H>  /  ALT  38  /  AlkPhos  124<H>  07-12  TPro  8.3  /  Alb  3.6  /  TBili  2.5<H>  /  DBili  x   /  AST  81<H>  /  ALT  48<H>  /  AlkPhos  137<H>  07-12      PT/INR - ( 12 Jul 2024 12:40 )   PT: 12.6 sec;   INR: 1.13 ratio         PTT - ( 12 Jul 2024 12:40 )  PTT:33.3 sec              Urinalysis Basic - ( 13 Jul 2024 06:06 )    Color: x / Appearance: x / SG: x / pH: x  Gluc: 156 mg/dL / Ketone: x  / Bili: x / Urobili: x   Blood: x / Protein: x / Nitrite: x   Leuk Esterase: x / RBC: x / WBC x   Sq Epi: x / Non Sq Epi: x / Bacteria: x                              14.2   20.47 )-----------( 186      ( 13 Jul 2024 06:06 )             40.9                         16.2   27.50 )-----------( 180      ( 12 Jul 2024 12:40 )             44.3     CAPILLARY BLOOD GLUCOSE          RADIOLOGY & ADDITIONAL TESTS:

## 2024-07-14 NOTE — PROGRESS NOTE ADULT - PROBLEM SELECTOR PLAN 2
Likely 2/2 to UTI  - Leukocytosis to 27.5 and tachycardic to 100 but afebrile   - UA showing LE, Nitrite  - Lactate 2.7  - S/p 2L LR in ED  - S/p 1x dose CTX (7/12) in ED   - F/u UCx, GI PCR, C.diff  - Stop NS 80 cc/hr  - Trend Lactate  - Abx as above Likely 2/2 to UTI  - Leukocytosis to 27.5 and tachycardic to 100 but afebrile   - UA showing LE, Nitrite  - Lactate 2.7  - S/p 2L LR in ED  - S/p 1x dose CTX (7/12) in ED   > GI PCR + adenovirus  > F/u UCx spp  - Trend Lactate  - Abx as above

## 2024-07-14 NOTE — PROGRESS NOTE ADULT - PROBLEM SELECTOR PLAN 4
Multiple episodes of watery, nonbloody diarrhea for 2 days, likely viral  - Patient has no history of recent abx use, less likely c. diff    - Promote PO hydration  - I&O  - Stool Count  - Stool PCR  - Stool Culture  - Stool C. diff Multiple episodes of watery, nonbloody diarrhea for 2 days, likely viral  - Patient has no history of recent abx use, less likely c. diff    - Promote PO hydration  - I&O  - Stool Count  - Stool PCR + Adenovirus   - Stool Culture prelim negative

## 2024-07-14 NOTE — PROGRESS NOTE ADULT - ASSESSMENT
28 year old man with hypertension, BPH, Hx of lower GI bleed who presented to [facility] ED for R-sided abdominal pain and diarrhea. Patient presented with tachycardia, elevated WBC, UA significant for UTI with normal CT abdomen. Patient admitted for UTI likely 2/2 with BPH and BRANDON likely 2/2 watery diarrhea and decreased PO intake.

## 2024-07-14 NOTE — PROGRESS NOTE ADULT - PROBLEM SELECTOR PLAN 1
Likely 2/2 to BPH  - UA showing LE, Nitrites   - no urine cell counts resulted   - S/p 1g CTX in ED   - F/u UCx   - ceftriaxone 1g for 7 days (7/12- Likely 2/2 to BPH  - UA showing LE, Nitrites   - no urine cell counts resulted   - S/p 1g CTX in ED   - UCx + E Coli spp pending   - ceftriaxone 1g for 7 days (7/12-

## 2024-07-14 NOTE — PROGRESS NOTE ADULT - PROBLEM SELECTOR PLAN 5
Likely 2/2 dehydration from decreased PO intake  - Stop NS 80 cc/hr  - Encourage PO intake  - monitor I&O Likely 2/2 dehydration from decreased PO intake  - Encourage PO intake  - monitor I&O

## 2024-07-15 ENCOUNTER — TRANSCRIPTION ENCOUNTER (OUTPATIENT)
Age: 64
End: 2024-07-15

## 2024-07-15 VITALS
OXYGEN SATURATION: 99 % | RESPIRATION RATE: 18 BRPM | SYSTOLIC BLOOD PRESSURE: 155 MMHG | HEART RATE: 75 BPM | DIASTOLIC BLOOD PRESSURE: 72 MMHG

## 2024-07-15 LAB
ALBUMIN SERPL ELPH-MCNC: 3.1 G/DL — LOW (ref 3.3–5)
ALP SERPL-CCNC: 95 U/L — SIGNIFICANT CHANGE UP (ref 40–120)
ALT FLD-CCNC: 26 U/L — SIGNIFICANT CHANGE UP (ref 4–41)
ANION GAP SERPL CALC-SCNC: 11 MMOL/L — SIGNIFICANT CHANGE UP (ref 7–14)
AST SERPL-CCNC: 23 U/L — SIGNIFICANT CHANGE UP (ref 4–40)
BASOPHILS # BLD AUTO: 0.07 K/UL — SIGNIFICANT CHANGE UP (ref 0–0.2)
BASOPHILS NFR BLD AUTO: 0.9 % — SIGNIFICANT CHANGE UP (ref 0–2)
BILIRUB SERPL-MCNC: 0.6 MG/DL — SIGNIFICANT CHANGE UP (ref 0.2–1.2)
BUN SERPL-MCNC: 16 MG/DL — SIGNIFICANT CHANGE UP (ref 7–23)
CALCIUM SERPL-MCNC: 8.7 MG/DL — SIGNIFICANT CHANGE UP (ref 8.4–10.5)
CHLORIDE SERPL-SCNC: 104 MMOL/L — SIGNIFICANT CHANGE UP (ref 98–107)
CO2 SERPL-SCNC: 21 MMOL/L — LOW (ref 22–31)
CREAT SERPL-MCNC: 0.99 MG/DL — SIGNIFICANT CHANGE UP (ref 0.5–1.3)
CULTURE RESULTS: SIGNIFICANT CHANGE UP
EGFR: 86 ML/MIN/1.73M2 — SIGNIFICANT CHANGE UP
EOSINOPHIL # BLD AUTO: 0.15 K/UL — SIGNIFICANT CHANGE UP (ref 0–0.5)
EOSINOPHIL NFR BLD AUTO: 1.9 % — SIGNIFICANT CHANGE UP (ref 0–6)
GLUCOSE SERPL-MCNC: 105 MG/DL — HIGH (ref 70–99)
HCT VFR BLD CALC: 40.7 % — SIGNIFICANT CHANGE UP (ref 39–50)
HGB BLD-MCNC: 13.8 G/DL — SIGNIFICANT CHANGE UP (ref 13–17)
IANC: 4.69 K/UL — SIGNIFICANT CHANGE UP (ref 1.8–7.4)
IMM GRANULOCYTES NFR BLD AUTO: 0.8 % — SIGNIFICANT CHANGE UP (ref 0–0.9)
LYMPHOCYTES # BLD AUTO: 2.13 K/UL — SIGNIFICANT CHANGE UP (ref 1–3.3)
LYMPHOCYTES # BLD AUTO: 26.8 % — SIGNIFICANT CHANGE UP (ref 13–44)
MAGNESIUM SERPL-MCNC: 2 MG/DL — SIGNIFICANT CHANGE UP (ref 1.6–2.6)
MCHC RBC-ENTMCNC: 29.2 PG — SIGNIFICANT CHANGE UP (ref 27–34)
MCHC RBC-ENTMCNC: 33.9 GM/DL — SIGNIFICANT CHANGE UP (ref 32–36)
MCV RBC AUTO: 86 FL — SIGNIFICANT CHANGE UP (ref 80–100)
MONOCYTES # BLD AUTO: 0.86 K/UL — SIGNIFICANT CHANGE UP (ref 0–0.9)
MONOCYTES NFR BLD AUTO: 10.8 % — SIGNIFICANT CHANGE UP (ref 2–14)
NEUTROPHILS # BLD AUTO: 4.69 K/UL — SIGNIFICANT CHANGE UP (ref 1.8–7.4)
NEUTROPHILS NFR BLD AUTO: 58.8 % — SIGNIFICANT CHANGE UP (ref 43–77)
NRBC # BLD: 0 /100 WBCS — SIGNIFICANT CHANGE UP (ref 0–0)
NRBC # FLD: 0 K/UL — SIGNIFICANT CHANGE UP (ref 0–0)
PHOSPHATE SERPL-MCNC: 2.7 MG/DL — SIGNIFICANT CHANGE UP (ref 2.5–4.5)
PLATELET # BLD AUTO: 191 K/UL — SIGNIFICANT CHANGE UP (ref 150–400)
POTASSIUM SERPL-MCNC: 3.8 MMOL/L — SIGNIFICANT CHANGE UP (ref 3.5–5.3)
POTASSIUM SERPL-SCNC: 3.8 MMOL/L — SIGNIFICANT CHANGE UP (ref 3.5–5.3)
PROT SERPL-MCNC: 6.6 G/DL — SIGNIFICANT CHANGE UP (ref 6–8.3)
RBC # BLD: 4.73 M/UL — SIGNIFICANT CHANGE UP (ref 4.2–5.8)
RBC # FLD: 13.1 % — SIGNIFICANT CHANGE UP (ref 10.3–14.5)
SODIUM SERPL-SCNC: 136 MMOL/L — SIGNIFICANT CHANGE UP (ref 135–145)
SPECIMEN SOURCE: SIGNIFICANT CHANGE UP
WBC # BLD: 7.96 K/UL — SIGNIFICANT CHANGE UP (ref 3.8–10.5)
WBC # FLD AUTO: 7.96 K/UL — SIGNIFICANT CHANGE UP (ref 3.8–10.5)

## 2024-07-15 PROCEDURE — 99239 HOSP IP/OBS DSCHRG MGMT >30: CPT | Mod: GC

## 2024-07-15 RX ORDER — VALSARTAN 320 MG/1
160 TABLET ORAL
Refills: 0 | Status: DISCONTINUED | OUTPATIENT
Start: 2024-07-15 | End: 2024-07-15

## 2024-07-15 RX ORDER — TAMSULOSIN HYDROCHLORIDE 0.4 MG/1
1 CAPSULE ORAL
Qty: 30 | Refills: 0
Start: 2024-07-15 | End: 2024-08-13

## 2024-07-15 RX ORDER — CIPROFLOXACIN HCL 500 MG
1 TABLET ORAL
Qty: 8 | Refills: 0
Start: 2024-07-15 | End: 2024-07-18

## 2024-07-15 RX ORDER — AMLODIPINE BESYLATE 2.5 MG/1
10 TABLET ORAL DAILY
Refills: 0 | Status: DISCONTINUED | OUTPATIENT
Start: 2024-07-15 | End: 2024-07-15

## 2024-07-15 RX ADMIN — AMLODIPINE BESYLATE 10 MILLIGRAM(S): 2.5 TABLET ORAL at 06:53

## 2024-07-15 RX ADMIN — VALSARTAN 160 MILLIGRAM(S): 320 TABLET ORAL at 06:53

## 2024-07-15 NOTE — PROGRESS NOTE ADULT - ASSESSMENT
28 year old man with hypertension, BPH, Hx of lower GI bleed who presented to [facility] ED for R-sided abdominal pain and diarrhea. Patient presented with tachycardia, elevated WBC, UA significant for UTI with normal CT abdomen. Patient admitted for UTI likely 2/2 with BPH and BRANDON likely 2/2 watery diarrhea and decreased PO intake.

## 2024-07-15 NOTE — PROGRESS NOTE ADULT - PROBLEM SELECTOR PLAN 6
Likely 2/2 to medication vs infection vs acute viral hepatitis  > Acute viral hepatitis panel negative

## 2024-07-15 NOTE — PROGRESS NOTE ADULT - PROBLEM SELECTOR PLAN 7
Patient with biopsy in February/March  - F/u will outpatient PCP Patient with biopsy in February/March  - Flomax  - F/u will outpatient PCP

## 2024-07-15 NOTE — PROVIDER CONTACT NOTE (CRITICAL VALUE NOTIFICATION) - BACKGROUND
63 y/o m with pmh of HTN, unriary frequency, diarrhea and abdominal pain is hospitalized for leukocytosis

## 2024-07-15 NOTE — PROGRESS NOTE ADULT - PROBLEM SELECTOR PLAN 2
Likely 2/2 to UTI  - Leukocytosis to 27.5 and tachycardic to 100 but afebrile   - UA showing LE, Nitrite  - Lactate 2.7  - S/p 2L LR in ED  - S/p 1x dose CTX (7/12) in ED   > GI PCR + adenovirus  > F/u UCx spp  - Trend Lactate  - Abx as above Likely 2/2 to UTI  - Leukocytosis to 27.5 and tachycardic to 100 but afebrile   - UA showing LE, Nitrite  - Lactate 2.7  - S/p 2L LR in ED  - S/p 1x dose CTX (7/12) in ED   - GI PCR + adenovirus  - F/u UCx spp will f/u w/ patient if sensitivities   - Trend Lactate  - Abx as above

## 2024-07-15 NOTE — DISCHARGE NOTE NURSING/CASE MANAGEMENT/SOCIAL WORK - PATIENT PORTAL LINK FT
You can access the FollowMyHealth Patient Portal offered by [facility] by registering at the following website: [email or web address]. By joining [employer]’s FollowMyHealth portal, you will also be able to view your health information using other applications (apps) compatible with our system.

## 2024-07-15 NOTE — PROGRESS NOTE ADULT - PROBLEM SELECTOR PLAN 9
DVT PPx: Heparin subq  Diet: DASH  Code Status: Full  Dispo: pending medical course DVT PPx: Heparin subq  Diet: DASH  Code Status: Full  Dispo: Discharge today 7/15

## 2024-07-15 NOTE — DISCHARGE NOTE NURSING/CASE MANAGEMENT/SOCIAL WORK - NSDCFUADDAPPT_GEN_ALL_CORE_FT
APPTS ARE READY TO BE MADE: [ ] YES    Best Family or Patient Contact (if needed):    Additional Information about above appointments (if needed):    1: See our PCP to follow up your prostate (Avani Mario) - 1 week  2:   3:     Other comments or requests:

## 2024-07-15 NOTE — PROGRESS NOTE ADULT - PROBLEM SELECTOR PLAN 4
Multiple episodes of watery, nonbloody diarrhea for 2 days, likely viral  - Patient has no history of recent abx use, less likely c. diff    - Promote PO hydration  - I&O  - Stool Count  - Stool PCR + Adenovirus   - Stool Culture prelim negative

## 2024-07-15 NOTE — PROGRESS NOTE ADULT - PROBLEM SELECTOR PLAN 8
- Will hold home amlodipine and valsartan in setting of sepsis and BRANDON  - Will monitor

## 2024-07-15 NOTE — PROGRESS NOTE ADULT - PROBLEM SELECTOR PLAN 3
Likely pre-renal 2/2 to dehydration form decreased PO intake and watery diarrhea   - Improving  - Cr. 2.6 at ED, baseline ~1.1   - Metabolic Acidosis w/ Bicarb 17 at ED  - s/p 2L LR in ED   - Encourage PO intake  - C/w DASH diet

## 2024-07-15 NOTE — PROGRESS NOTE ADULT - PROBLEM SELECTOR PLAN 1
Likely 2/2 to BPH  - UA showing LE, Nitrites   - no urine cell counts resulted   - S/p 1g CTX in ED   - UCx + E Coli spp pending   - ceftriaxone 1g for 7 days (7/12- Likely 2/2 to BPH  - UA showing LE, Nitrites   - no urine cell counts resulted   - S/p 1g CTX in ED   - UCx + E Coli spp pending   - ceftriaxone 1g for 7 days (7/12-7/14)  - Will d/c on ciprofloxacin 500mg PO BID for 4 days

## 2024-07-15 NOTE — PROGRESS NOTE ADULT - SUBJECTIVE AND OBJECTIVE BOX
INTERVAL HPI/OVERNIGHT EVENTS:  Pt seen and examined at bedside. No acute overnight events or complaints.    VITAL SIGNS:  T(F): 97.9 (07-15-24 @ 04:53)  HR: 75 (07-15-24 @ 06:40)  BP: 155/72 (07-15-24 @ 06:40)  RR: 18 (07-15-24 @ 06:40)  SpO2: 99% (07-15-24 @ 06:40)  Wt(kg): --    PHYSICAL EXAM:    Constitutional: WDWN, NAD  HEENT: PERRL, EOMI, sclera non-icteric, neck supple, trachea midline, no masses, no JVD, MMM, good dentition  Respiratory: CTA b/l, good air entry b/l, no wheezing, no rhonchi, no rales, without accessory muscle use and no intercostal retractions  Cardiovascular: RRR, normal S1S2, no M/R/G  Gastrointestinal: soft, NTND, no masses palpable, BS normal  Extremities: Warm, well perfused, pulses equal bilateral upper and lower extremities, no edema, no clubbing. Capillary refill <2 sec  Neurological: AAOx3, CN Grossly intact  Skin: Normal temperature, warm, dry    MEDICATIONS  (STANDING):  amLODIPine   Tablet 10 milliGRAM(s) Oral daily  cefTRIAXone   IVPB 1000 milliGRAM(s) IV Intermittent every 24 hours  heparin   Injectable 5000 Unit(s) SubCutaneous every 8 hours  tamsulosin 0.4 milliGRAM(s) Oral at bedtime  valsartan 160 milliGRAM(s) Oral two times a day    MEDICATIONS  (PRN):      Allergies    No Known Allergies    Intolerances        LABS:                        13.8   7.96  )-----------( 191      ( 15 Jul 2024 06:15 )             40.7     07-14    137  |  102  |  15  ----------------------------<  101<H>  3.5   |  21<L>  |  1.00    Ca    8.6      14 Jul 2024 06:47  Phos  2.7     07-14  Mg     2.00     07-14    TPro  6.8  /  Alb  3.0<L>  /  TBili  0.7  /  DBili  x   /  AST  28  /  ALT  28  /  AlkPhos  105  07-14      Urinalysis Basic - ( 14 Jul 2024 06:47 )    Color: x / Appearance: x / SG: x / pH: x  Gluc: 101 mg/dL / Ketone: x  / Bili: x / Urobili: x   Blood: x / Protein: x / Nitrite: x   Leuk Esterase: x / RBC: x / WBC x   Sq Epi: x / Non Sq Epi: x / Bacteria: x        RADIOLOGY & ADDITIONAL TESTS:  Reviewed      ******************  Authored By: Truong Hutson MD PGY1  Internal Medicine  MS Teams Preferred  ******************   INTERVAL HPI/OVERNIGHT EVENTS:  Pt seen and examined at bedside. No acute overnight events or complaints. Patient denies fevers/chills, burning with urination, frequency or urgency.    VITAL SIGNS:  T(F): 97.9 (07-15-24 @ 04:53)  HR: 75 (07-15-24 @ 06:40)  BP: 155/72 (07-15-24 @ 06:40)  RR: 18 (07-15-24 @ 06:40)  SpO2: 99% (07-15-24 @ 06:40)  Wt(kg): --    PHYSICAL EXAM:    CONSTITUTIONAL: NAD, well-developed, well-groomed  EYES:; conjunctiva and sclera clear  ENMT: Moist oral mucosa, no pharyngeal injection or exudates; normal dentition  NECK: Supple, no palpable masses; no thyromegaly  RESPIRATORY: Normal respiratory effort; lungs are clear to auscultation bilaterally  CARDIOVASCULAR: Regular rate and rhythm, normal S1 and S2, no murmur/rub/gallop; No lower extremity edema  ABDOMEN: mild tenderness to palpation lower abdomen, normoactive bowel sounds, no rebound/guarding; No hepatosplenomegaly  MUSCULOSKELETAL:  Normal gait; no clubbing or cyanosis of digits; no joint swelling or tenderness to palpation  PSYCH: A+O x 3 to person, place, and time; affect appropriate  NEUROLOGY: no gross sensory deficits   SKIN: No rashes; no palpable lesions    MEDICATIONS  (STANDING):  amLODIPine   Tablet 10 milliGRAM(s) Oral daily  cefTRIAXone   IVPB 1000 milliGRAM(s) IV Intermittent every 24 hours  heparin   Injectable 5000 Unit(s) SubCutaneous every 8 hours  tamsulosin 0.4 milliGRAM(s) Oral at bedtime  valsartan 160 milliGRAM(s) Oral two times a day    MEDICATIONS  (PRN):      Allergies    No Known Allergies    Intolerances        LABS:                        13.8   7.96  )-----------( 191      ( 15 Jul 2024 06:15 )             40.7     07-14    137  |  102  |  15  ----------------------------<  101<H>  3.5   |  21<L>  |  1.00    Ca    8.6      14 Jul 2024 06:47  Phos  2.7     07-14  Mg     2.00     07-14    TPro  6.8  /  Alb  3.0<L>  /  TBili  0.7  /  DBili  x   /  AST  28  /  ALT  28  /  AlkPhos  105  07-14      Urinalysis Basic - ( 14 Jul 2024 06:47 )    Color: x / Appearance: x / SG: x / pH: x  Gluc: 101 mg/dL / Ketone: x  / Bili: x / Urobili: x   Blood: x / Protein: x / Nitrite: x   Leuk Esterase: x / RBC: x / WBC x   Sq Epi: x / Non Sq Epi: x / Bacteria: x        RADIOLOGY & ADDITIONAL TESTS:  Reviewed      ******************  Authored By: Truong Hutson MD PGY1  Internal Medicine  MS Teams Preferred  ******************

## 2024-07-15 NOTE — SBIRT NOTE ADULT - NSSBIRTUNABLESCROTHER_GEN_A_CORE
Currently is very well controlled on lifestyle changes only.  Encouraged her to continue with low carbohydrate diet, 30 minutes exercise and weight loss.  Will recheck in 6 months.  Did give referral to see eye doctor, will check for microalbuminuria today   Pt discharged to home prior to completion of SBIRT.

## 2024-07-15 NOTE — PROGRESS NOTE ADULT - ATTENDING COMMENTS
64M w/ hypertension, BPH, and recent dental procedure presenting with R-sided abdominal pain, diarrhea, and urinary symptoms found to have severe sepsis (elevated lactate, BRANDON, transaminitis) due to acute complicated cystitis- improving after antibiotics and IV fluid.    GI PCR + adenovirus. C diff cancelled by lab due to formed stool.     - F/up urine cultures; blood cultures were not obtained on admission and would be lower yield now after antibiotics and clinical improvement  - Continue ceftriaxone  - Encourage oral hydration and monitor BMP daily  - Holding home anti-hypertensives     Time-based billing (NON-critical care).   35 minutes spent on total encounter; more than 50% of the visit was spent counseling and / or coordinating care by the attending physician.  The necessity of the time spent during the encounter on this date of service was due to:     documentation in [place], reviewing chart and coordinating care with patient/resident and interdisciplinary staff (such as , social workers, etc) as well as reviewing vitals, laboratory data, radiology, medication list, consultants' recommendations and prior records. Interventions were performed as documented above.
64M w/ hypertension, BPH, and recent adenovirus gastroenteritis admitted with severe sepsis (elevated lactate, BRANDON, transaminitis) due to acute complicated cystitis- improved after antibiotics and IV fluid.    - F/up urine cultures; blood cultures were not obtained on admission and would be lower yield now after antibiotics and clinical improvement  - Continue ceftriaxone - plan to discharge on cipro to complete 7d course (assuming not resistent when sensitivities return) to cover bacteremia in case this was present on admission  - Resume home anti-hypertensives         Time-based billing (NON-critical care).   35 minutes spent on total encounter; more than 50% of the visit was spent counseling and / or coordinating care by the attending physician.  The necessity of the time spent during the encounter on this date of service was due to:     documentation in [employer], reviewing chart and coordinating care with patient/resident and interdisciplinary staff (such as , social workers, etc) as well as reviewing vitals, laboratory data, radiology, medication list, consultants' recommendations and prior records. Interventions were performed as documented above.
64M w/ hypertension, BPH, and recent dental procedure presenting with R-sided abdominal pain, diarrhea, and urinary symptoms found to have severe sepsis (elevated lactate, BRANDON, transaminitis) due to acute complicated cystitis- improving after antibiotics and IV fluid.    This morning feeling much better. Had a formed BM this morning. Urinary symptoms are much improved. Energy level is much better.    - F/up urine cultures; blood cultures were not obtained on admission and would be lower yield now after antibiotics and clinical improvement  - Continue ceftriaxone  - Encourage oral hydration and monitor BMP daily  - Holding home anti-hypertensives   - F/up stool studies, although lower suspicion for C diff    Time-based billing (NON-critical care).   35 minutes spent on total encounter; more than 50% of the visit was spent counseling and / or coordinating care by the attending physician.  The necessity of the time spent during the encounter on this date of service was due to:     documentation in [place], reviewing chart and coordinating care with patient/resident and interdisciplinary staff (such as , social workers, etc) as well as reviewing vitals, laboratory data, radiology, medication list, consultants' recommendations and prior records. Interventions were performed as documented above.

## 2024-07-16 LAB
-  AMOXICILLIN/CLAVULANIC ACID: SIGNIFICANT CHANGE UP
-  AMPICILLIN/SULBACTAM: SIGNIFICANT CHANGE UP
-  AMPICILLIN: SIGNIFICANT CHANGE UP
-  AZTREONAM: SIGNIFICANT CHANGE UP
-  CEFAZOLIN: SIGNIFICANT CHANGE UP
-  CEFEPIME: SIGNIFICANT CHANGE UP
-  CEFOXITIN: SIGNIFICANT CHANGE UP
-  CEFTRIAXONE: SIGNIFICANT CHANGE UP
-  CEFUROXIME: SIGNIFICANT CHANGE UP
-  CIPROFLOXACIN: SIGNIFICANT CHANGE UP
-  ERTAPENEM: SIGNIFICANT CHANGE UP
-  GENTAMICIN: SIGNIFICANT CHANGE UP
-  IMIPENEM: SIGNIFICANT CHANGE UP
-  LEVOFLOXACIN: SIGNIFICANT CHANGE UP
-  MEROPENEM: SIGNIFICANT CHANGE UP
-  NITROFURANTOIN: SIGNIFICANT CHANGE UP
-  PIPERACILLIN/TAZOBACTAM: SIGNIFICANT CHANGE UP
-  TOBRAMYCIN: SIGNIFICANT CHANGE UP
-  TRIMETHOPRIM/SULFAMETHOXAZOLE: SIGNIFICANT CHANGE UP
CULTURE RESULTS: ABNORMAL
METHOD TYPE: SIGNIFICANT CHANGE UP
ORGANISM # SPEC MICROSCOPIC CNT: ABNORMAL
ORGANISM # SPEC MICROSCOPIC CNT: ABNORMAL
SPECIMEN SOURCE: SIGNIFICANT CHANGE UP

## 2024-08-29 ENCOUNTER — RX RENEWAL (OUTPATIENT)
Age: 64
End: 2024-08-29

## 2024-09-23 NOTE — ED ADULT NURSE REASSESSMENT NOTE - NS ED NURSE REASSESS COMMENT FT1
Patient's blood pressure remains elevated. Patient is A&Ox4. Respirations even and unlabored. Continues to deny vision changes and headache. MD aware.
Lab: [id number]
Lab: [id number]
Patent

## 2024-09-25 NOTE — ED ADULT TRIAGE NOTE - NS ED TRIAGE AVPU SCALE
Alert-The patient is alert, awake and responds to voice. The patient is oriented to time, place, and person. The triage nurse is able to obtain subjective information. [4 x 4] : 4 x 4  [Abdominal Pad] : Abdominal Pad [2+] : left 2+ [Ankle Swelling (On Exam)] : not present [Varicose Veins Of Lower Extremities] : not present [] : not present [de-identified] : A&Ox3, NAD [de-identified] : 5 out of 5 strength in all quadrants bilaterally, ankle joint and subtalar joint range of motion intact [de-identified] : Status post left ankle soft tissue mass excision. Right ankle incision dehiscence with open wound down to skin, subcutaneous tissue, fat, granular tissue - noted with progress of healing  [de-identified] : Light touch sensation intact bilaterally [FreeTextEntry1] : Right foot, S/P surgical debridement 8/20/24     [FreeTextEntry2] : 2.4 [FreeTextEntry3] : 1.6 [FreeTextEntry4] : 0.1-0.2 [de-identified] : Serous/sanguinous [de-identified] : 20% [de-identified] : Patient denies any pain or discomfort post Coban application.  Circulatory and Neuromuscular status is WNL.  [de-identified] : Juany [de-identified] : Cleansed with 0.9% Normal Saline  Kerlix Coban regular  [TWNoteComboBox4] : Small [TWNoteComboBox5] : No [TWNoteComboBox6] : Surgical [de-identified] : No [de-identified] : False [de-identified] : None [de-identified] : None [de-identified] : >75% [de-identified] : No [de-identified] : Multilayer other compression wrap [de-identified] : Every other day [de-identified] : Primary Dressing

## 2024-10-15 ENCOUNTER — APPOINTMENT (OUTPATIENT)
Dept: UROLOGY | Facility: CLINIC | Age: 64
End: 2024-10-15
Payer: MEDICAID

## 2024-10-15 VITALS
HEART RATE: 84 BPM | DIASTOLIC BLOOD PRESSURE: 81 MMHG | SYSTOLIC BLOOD PRESSURE: 141 MMHG | OXYGEN SATURATION: 94 % | TEMPERATURE: 97.6 F

## 2024-10-15 DIAGNOSIS — N40.1 BENIGN PROSTATIC HYPERPLASIA WITH LOWER URINARY TRACT SYMPMS: ICD-10-CM

## 2024-10-15 DIAGNOSIS — Z86.79 PERSONAL HISTORY OF OTHER DISEASES OF THE CIRCULATORY SYSTEM: ICD-10-CM

## 2024-10-15 DIAGNOSIS — R97.20 ELEVATED PROSTATE, SPECIFIC ANTIGEN [PSA]: ICD-10-CM

## 2024-10-15 DIAGNOSIS — N13.8 BENIGN PROSTATIC HYPERPLASIA WITH LOWER URINARY TRACT SYMPMS: ICD-10-CM

## 2024-10-15 DIAGNOSIS — F17.210 NICOTINE DEPENDENCE, CIGARETTES, UNCOMPLICATED: ICD-10-CM

## 2024-10-15 PROCEDURE — G2211 COMPLEX E/M VISIT ADD ON: CPT | Mod: NC

## 2024-10-15 PROCEDURE — 99214 OFFICE O/P EST MOD 30 MIN: CPT

## 2024-10-16 ENCOUNTER — NON-APPOINTMENT (OUTPATIENT)
Age: 64
End: 2024-10-16

## 2024-10-16 LAB
BACTERIA UR CULT: NORMAL
PSA SERPL-MCNC: 8.05 NG/ML

## 2024-11-05 ENCOUNTER — NON-APPOINTMENT (OUTPATIENT)
Age: 64
End: 2024-11-05

## 2024-11-05 ENCOUNTER — APPOINTMENT (OUTPATIENT)
Dept: UROLOGY | Facility: CLINIC | Age: 64
End: 2024-11-05
Payer: MEDICAID

## 2024-11-05 VITALS
WEIGHT: 147 LBS | HEIGHT: 65 IN | TEMPERATURE: 97.3 F | OXYGEN SATURATION: 97 % | BODY MASS INDEX: 24.49 KG/M2 | SYSTOLIC BLOOD PRESSURE: 138 MMHG | HEART RATE: 86 BPM | DIASTOLIC BLOOD PRESSURE: 72 MMHG

## 2024-11-05 PROCEDURE — 76872 US TRANSRECTAL: CPT

## 2024-11-05 PROCEDURE — 55700: CPT | Mod: 22

## 2024-11-07 LAB — PROSTATE BIOPSY: NORMAL

## 2024-11-12 ENCOUNTER — APPOINTMENT (OUTPATIENT)
Dept: UROLOGY | Facility: CLINIC | Age: 64
End: 2024-11-12
Payer: MEDICAID

## 2024-11-12 VITALS
WEIGHT: 147 LBS | BODY MASS INDEX: 24.49 KG/M2 | SYSTOLIC BLOOD PRESSURE: 157 MMHG | HEIGHT: 65 IN | OXYGEN SATURATION: 98 % | DIASTOLIC BLOOD PRESSURE: 94 MMHG | HEART RATE: 63 BPM | TEMPERATURE: 96 F

## 2024-11-12 DIAGNOSIS — N40.1 BENIGN PROSTATIC HYPERPLASIA WITH LOWER URINARY TRACT SYMPMS: ICD-10-CM

## 2024-11-12 DIAGNOSIS — R97.20 ELEVATED PROSTATE, SPECIFIC ANTIGEN [PSA]: ICD-10-CM

## 2024-11-12 DIAGNOSIS — N13.8 BENIGN PROSTATIC HYPERPLASIA WITH LOWER URINARY TRACT SYMPMS: ICD-10-CM

## 2024-11-12 PROCEDURE — G2211 COMPLEX E/M VISIT ADD ON: CPT | Mod: NC

## 2024-11-12 PROCEDURE — 99213 OFFICE O/P EST LOW 20 MIN: CPT

## 2025-05-13 ENCOUNTER — APPOINTMENT (OUTPATIENT)
Dept: UROLOGY | Facility: CLINIC | Age: 65
End: 2025-05-13
Payer: MEDICAID

## 2025-05-13 VITALS
RESPIRATION RATE: 16 BRPM | WEIGHT: 147 LBS | TEMPERATURE: 97.8 F | HEIGHT: 65 IN | BODY MASS INDEX: 24.49 KG/M2 | SYSTOLIC BLOOD PRESSURE: 161 MMHG | HEART RATE: 85 BPM | OXYGEN SATURATION: 97 % | DIASTOLIC BLOOD PRESSURE: 81 MMHG

## 2025-05-13 DIAGNOSIS — R97.20 ELEVATED PROSTATE, SPECIFIC ANTIGEN [PSA]: ICD-10-CM

## 2025-05-13 DIAGNOSIS — N40.1 BENIGN PROSTATIC HYPERPLASIA WITH LOWER URINARY TRACT SYMPMS: ICD-10-CM

## 2025-05-13 DIAGNOSIS — N52.9 MALE ERECTILE DYSFUNCTION, UNSPECIFIED: ICD-10-CM

## 2025-05-13 DIAGNOSIS — N13.8 BENIGN PROSTATIC HYPERPLASIA WITH LOWER URINARY TRACT SYMPMS: ICD-10-CM

## 2025-05-13 PROCEDURE — 99214 OFFICE O/P EST MOD 30 MIN: CPT

## 2025-05-13 PROCEDURE — G2211 COMPLEX E/M VISIT ADD ON: CPT | Mod: NC

## 2025-05-13 RX ORDER — TADALAFIL 5 MG/1
5 TABLET ORAL
Qty: 90 | Refills: 1 | Status: ACTIVE | COMMUNITY
Start: 2025-05-13 | End: 1900-01-01